# Patient Record
Sex: FEMALE | Race: BLACK OR AFRICAN AMERICAN | Employment: UNEMPLOYED | URBAN - METROPOLITAN AREA
[De-identification: names, ages, dates, MRNs, and addresses within clinical notes are randomized per-mention and may not be internally consistent; named-entity substitution may affect disease eponyms.]

---
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
## 2023-10-03 ENCOUNTER — TELEMEDICINE (OUTPATIENT)
Age: 18
End: 2023-10-03

## 2023-10-03 DIAGNOSIS — L30.9 ECZEMA, UNSPECIFIED TYPE: Primary | ICD-10-CM

## 2023-10-03 RX ORDER — TRIAMCINOLONE ACETONIDE 5 MG/G
CREAM TOPICAL
Qty: 454 G | Refills: 2 | Status: SHIPPED | OUTPATIENT
Start: 2023-10-03 | End: 2023-10-10

## 2023-10-03 SDOH — ECONOMIC STABILITY: INCOME INSECURITY: HOW HARD IS IT FOR YOU TO PAY FOR THE VERY BASICS LIKE FOOD, HOUSING, MEDICAL CARE, AND HEATING?: NOT HARD AT ALL

## 2023-10-03 SDOH — ECONOMIC STABILITY: HOUSING INSECURITY
IN THE LAST 12 MONTHS, WAS THERE A TIME WHEN YOU DID NOT HAVE A STEADY PLACE TO SLEEP OR SLEPT IN A SHELTER (INCLUDING NOW)?: NO

## 2023-10-03 SDOH — ECONOMIC STABILITY: FOOD INSECURITY: WITHIN THE PAST 12 MONTHS, THE FOOD YOU BOUGHT JUST DIDN'T LAST AND YOU DIDN'T HAVE MONEY TO GET MORE.: NEVER TRUE

## 2023-10-03 SDOH — ECONOMIC STABILITY: FOOD INSECURITY: WITHIN THE PAST 12 MONTHS, YOU WORRIED THAT YOUR FOOD WOULD RUN OUT BEFORE YOU GOT MONEY TO BUY MORE.: NEVER TRUE

## 2023-10-03 ASSESSMENT — PATIENT HEALTH QUESTIONNAIRE - PHQ9
SUM OF ALL RESPONSES TO PHQ QUESTIONS 1-9: 0
SUM OF ALL RESPONSES TO PHQ9 QUESTIONS 1 & 2: 0
SUM OF ALL RESPONSES TO PHQ QUESTIONS 1-9: 0
SUM OF ALL RESPONSES TO PHQ QUESTIONS 1-9: 0
1. LITTLE INTEREST OR PLEASURE IN DOING THINGS: 0
2. FEELING DOWN, DEPRESSED OR HOPELESS: 0
SUM OF ALL RESPONSES TO PHQ QUESTIONS 1-9: 0

## 2023-10-03 NOTE — PROGRESS NOTES
Pc with pt. Has ghx of eczema in popliteal area and elbows. Was usint her aunts triamcinolone. Will send in rx for this medicine.

## 2023-10-03 NOTE — PROGRESS NOTES
Chief Complaint   Patient presents with    Eczema     Break out    1. Have you been to the ER, urgent care clinic since your last visit? Hospitalized since your last visit? No    2. Have you seen or consulted any other health care providers outside of the [address] [place] [address] since your last visit? Include any pap smears or colon screening.  No

## 2024-09-26 LAB
ABO, EXTERNAL RESULT: NORMAL
C. TRACHOMATIS, EXTERNAL RESULT: NEGATIVE
HEP B, EXTERNAL RESULT: NEGATIVE
HEPATITIS C ANTIBODY, EXTERNAL RESULT: NEGATIVE
HIV, EXTERNAL RESULT: NEGATIVE
N. GONORRHOEAE, EXTERNAL RESULT: NEGATIVE
RH FACTOR, EXTERNAL RESULT: POSITIVE
RPR, EXTERNAL RESULT: NORMAL
RUBELLA TITER, EXTERNAL RESULT: NORMAL

## 2024-11-09 ENCOUNTER — HOSPITAL ENCOUNTER (EMERGENCY)
Facility: HOSPITAL | Age: 19
Discharge: HOME | End: 2024-11-09
Attending: STUDENT IN AN ORGANIZED HEALTH CARE EDUCATION/TRAINING PROGRAM
Payer: MEDICAID

## 2024-11-09 VITALS
OXYGEN SATURATION: 100 % | RESPIRATION RATE: 16 BRPM | TEMPERATURE: 97.9 F | DIASTOLIC BLOOD PRESSURE: 79 MMHG | SYSTOLIC BLOOD PRESSURE: 148 MMHG | HEART RATE: 86 BPM

## 2024-11-09 DIAGNOSIS — Z34.92 ENCOUNTER FOR PREGNANCY RELATED EXAMINATION IN SECOND TRIMESTER: Primary | ICD-10-CM

## 2024-11-09 PROCEDURE — 99282 EMERGENCY DEPT VISIT SF MDM: CPT

## 2024-11-09 ASSESSMENT — PAIN - FUNCTIONAL ASSESSMENT: PAIN_FUNCTIONAL_ASSESSMENT: NONE - DENIES PAIN

## 2024-11-09 NOTE — ED PROVIDER NOTES
[facility] EMERGENCY DEPT  EMERGENCY DEPARTMENT ENCOUNTER       Pt Name: Buffy Montano  MRN: [id number]  Birthdate 2005  Date of evaluation: 2024  Provider: Michael Benitez MD   PCP: Dieudonne De Dios MD  Note Started: 4:48 PM EST 24     CHIEF COMPLAINT       Chief Complaint   Patient presents with    Pregnancy Problem     Pt is 5 months pregnant and reports no fetal movement x 5 days.  Reports \"some cramping yesterday x 1 hour\".  Pt denies vaginal bleeding.          HISTORY OF PRESENT ILLNESS: 1 or more elements      History From: patient, History limited by: none     Buffy Montano is a 18 y.o. female presenting with decreased fetal movement.  She notes she hasn't felt any fetal movement for 5 days.  She is  at 5 months pregnancy.  She had a small amount of cramping yesterday but it was transient and has felt well today.  Denies abdominal pain, NV, vaginal bleeding, DC or fluids.  No fevers.  Follow at ob clinic near [facility].       Please See MDM for Additional Details of the HPI/PMH  Nursing Notes were all reviewed and agreed with or any disagreements were addressed in the HPI.     REVIEW OF SYSTEMS        Positives and Pertinent negatives as per HPI.    PAST HISTORY     Past Medical History:  Past Medical History:   Diagnosis Date    Asthma     Ill-defined condition     Group B strep at birth 16 days in NICU       Past Surgical History:  History reviewed. No pertinent surgical history.    Family History:  History reviewed. No pertinent family history.    Social History:  Social History     Tobacco Use    Smoking status: Never    Smokeless tobacco: Never   Vaping Use    Vaping status: Never Used   Substance Use Topics    Alcohol use: No    Drug use: No       Allergies:  No Known Allergies    CURRENT MEDICATIONS      Previous Medications    ALBUTEROL SULFATE HFA (PROVENTIL;VENTOLIN;PROAIR) 108 (90 BASE) MCG/ACT INHALER    Inhale 1 puff into the lungs       SCREENINGS               No data recorded  display    Medical Decision Making  18yoF with PMH of asthma presenting with decreased fetal movement.  She notes she hasn't felt any fetal movement for 5 days.  She is  at 5 months pregnancy.  She had a small amount of cramping yesterday but it was transient and has felt well today.  Denies abdominal pain, NV, vaginal bleeding, DC or fluids.  No fevers.  Follow at ob clinic near [facility].  Fetal heart tones noted at 159.  Bedside fetal US shows good fetal movement, normal heart rate.  Appears to be normal amniotic fluid.  Given she is assymptomatic, No indication for labwork or advanced imaging at this time.  Patient was very relieved to hear fetal heart tones and doesn't want any further workup.  Discussed FU with her OB for possibly a formal US and further monitoring.  No clear signs of ectopic pregnancy, uterine rupture, placenta previa or acreta.            FINAL IMPRESSION     1. Encounter for pregnancy related examination in second trimester          DISPOSITION/PLAN   Buffy Montano's  results have been reviewed with her.  She has been counseled regarding her diagnosis, treatment, and plan.  She verbally conveys understanding and agreement of the signs, symptoms, diagnosis, treatment and prognosis and additionally agrees to follow up as discussed.  She also agrees with the care-plan and conveys that all of her questions have been answered.  I have also provided discharge instructions for her that include: educational information regarding their diagnosis and treatment, and list of reasons why they would want to return to the ED prior to their follow-up appointment, should her condition change.     CLINICAL IMPRESSION    Discharge Note: The patient is stable for discharge home. The signs, symptoms, diagnosis, and discharge instructions have been discussed, understanding conveyed, and agreed upon. The patient is to follow up as recommended or return to ER should their symptoms worsen.      PATIENT REFERRED

## 2024-11-09 NOTE — ED NOTES
Patient here with c/o pregnancy problem.  Patient states 5 months pregnant with LY March 14th 2025.  Patient states for the past 5 days she has not felt the baby move, either very little or not at all. Patient reports she had some abdominal cramping yesterday which lasted for about an hour but denies any bleeding.  Patient states that this is her first pregnancy.         Emergency Department Nursing Plan of Care       The Nursing Plan of Care is developed from the Nursing assessment and Emergency Department Attending provider initial evaluation.  The plan of care may be reviewed in the “ED Provider note”.      The Plan of Care was developed with the following considerations:  Patient / Family readiness to learn indicated by:verbalized understanding  Persons(s) to be included in education: patient  Barriers to Learning/Limitations:None      Signed     Elizabeth Pereira RN    11/9/2024   4:45 PM

## 2024-11-09 NOTE — ED NOTES
Patient given copy of dc instructions and 0 script(s). Patient verbalized their understanding of instructions and script(s). Patient given a current medication reconciliation form and verbalized understanding of their medications. Patient verbalized understanding of the importance of discussing medications with his or her physician or clinic they will be following up with. Patient alert and oriented and in no acute distress. Patient discharged from the ER, patient offered a wheelchair, and when offered patient declines wheelchair.

## 2024-12-17 ENCOUNTER — HOSPITAL ENCOUNTER (EMERGENCY)
Facility: HOSPITAL | Age: 19
Discharge: ANOTHER HOSPITAL | End: 2024-12-17
Attending: STUDENT IN AN ORGANIZED HEALTH CARE EDUCATION/TRAINING PROGRAM
Payer: MEDICAID

## 2024-12-17 ENCOUNTER — HOSPITAL ENCOUNTER (INPATIENT)
Facility: HOSPITAL | Age: 19
LOS: 1 days | Discharge: HOME | DRG: 566 | End: 2024-12-17
Attending: EMERGENCY MEDICINE | Admitting: OBSTETRICS & GYNECOLOGY
Payer: MEDICAID

## 2024-12-17 ENCOUNTER — APPOINTMENT (OUTPATIENT)
Facility: HOSPITAL | Age: 19
End: 2024-12-17
Payer: MEDICAID

## 2024-12-17 VITALS
OXYGEN SATURATION: 97 % | TEMPERATURE: 98 F | SYSTOLIC BLOOD PRESSURE: 96 MMHG | DIASTOLIC BLOOD PRESSURE: 53 MMHG | HEART RATE: 112 BPM | BODY MASS INDEX: 25.76 KG/M2 | HEIGHT: 62 IN | WEIGHT: 140 LBS | RESPIRATION RATE: 18 BRPM

## 2024-12-17 VITALS
SYSTOLIC BLOOD PRESSURE: 103 MMHG | HEIGHT: 62 IN | DIASTOLIC BLOOD PRESSURE: 76 MMHG | WEIGHT: 140 LBS | HEART RATE: 117 BPM | OXYGEN SATURATION: 96 % | RESPIRATION RATE: 29 BRPM | TEMPERATURE: 99.1 F | BODY MASS INDEX: 25.76 KG/M2

## 2024-12-17 DIAGNOSIS — O60.02 PRETERM LABOR IN SECOND TRIMESTER WITHOUT DELIVERY: ICD-10-CM

## 2024-12-17 DIAGNOSIS — J11.1 INFLUENZA: ICD-10-CM

## 2024-12-17 DIAGNOSIS — J18.9 PNEUMONIA OF RIGHT UPPER LOBE DUE TO INFECTIOUS ORGANISM: ICD-10-CM

## 2024-12-17 DIAGNOSIS — J11.1 FLU: Primary | ICD-10-CM

## 2024-12-17 DIAGNOSIS — O36.8130 DECREASED FETAL MOVEMENTS IN THIRD TRIMESTER, SINGLE OR UNSPECIFIED FETUS: ICD-10-CM

## 2024-12-17 DIAGNOSIS — G45.9 TIA (TRANSIENT ISCHEMIC ATTACK): Primary | ICD-10-CM

## 2024-12-17 PROBLEM — O47.00 PRETERM CONTRACTIONS: Status: ACTIVE | Noted: 2024-12-17

## 2024-12-17 LAB
ABO + RH BLD: NORMAL
ALBUMIN SERPL-MCNC: 2.3 G/DL (ref 3.5–5)
ALBUMIN/GLOB SERPL: 0.6 (ref 1.1–2.2)
ALP SERPL-CCNC: 52 U/L (ref 45–117)
ALT SERPL-CCNC: 24 U/L (ref 12–78)
ANION GAP SERPL CALC-SCNC: 11 MMOL/L (ref 2–12)
APPEARANCE UR: CLEAR
APTT PPP: 31.1 SEC (ref 22.1–31)
AST SERPL-CCNC: 22 U/L (ref 15–37)
BACTERIA URNS QL MICRO: NEGATIVE /HPF
BASOPHILS # BLD: 0 K/UL (ref 0–0.1)
BASOPHILS NFR BLD: 0 % (ref 0–1)
BILIRUB SERPL-MCNC: 1.4 MG/DL (ref 0.2–1)
BILIRUB UR QL: NEGATIVE
BLOOD GROUP ANTIBODIES SERPL: NORMAL
BUN SERPL-MCNC: 13 MG/DL (ref 6–20)
BUN/CREAT SERPL: 19 (ref 12–20)
CALCIUM SERPL-MCNC: 8.5 MG/DL (ref 8.5–10.1)
CHLORIDE SERPL-SCNC: 102 MMOL/L (ref 97–108)
CO2 SERPL-SCNC: 21 MMOL/L (ref 21–32)
COLOR UR: ABNORMAL
CREAT SERPL-MCNC: 0.67 MG/DL (ref 0.55–1.02)
DIFFERENTIAL METHOD BLD: ABNORMAL
EOSINOPHIL # BLD: 0 K/UL (ref 0–0.4)
EOSINOPHIL NFR BLD: 0 % (ref 0–7)
EPITH CASTS URNS QL MICRO: ABNORMAL /LPF
ERYTHROCYTE [DISTWIDTH] IN BLOOD BY AUTOMATED COUNT: 13.1 % (ref 11.5–14.5)
FETAL BLOOD VOL PATIENT KLEIH BETKE: NORMAL ML
FIBRINOGEN PPP-MCNC: 595 MG/DL (ref 200–475)
FLUAV RNA SPEC QL NAA+PROBE: DETECTED
FLUBV RNA SPEC QL NAA+PROBE: NOT DETECTED
GLOBULIN SER CALC-MCNC: 4.1 G/DL (ref 2–4)
GLUCOSE SERPL-MCNC: 109 MG/DL (ref 65–100)
GLUCOSE UR STRIP.AUTO-MCNC: NEGATIVE MG/DL
HCT VFR BLD AUTO: 30.2 % (ref 35–47)
HGB BLD-MCNC: 10.5 G/DL (ref 11.5–16)
HGB UR QL STRIP: NEGATIVE
IMM GRANULOCYTES # BLD AUTO: 0 K/UL
IMM GRANULOCYTES NFR BLD AUTO: 0 %
INR PPP: 1.2 (ref 0.9–1.1)
KETONES UR QL STRIP.AUTO: 40 MG/DL
LEUKOCYTE ESTERASE UR QL STRIP.AUTO: NEGATIVE
LYMPHOCYTES # BLD: 0.9 K/UL (ref 0.8–3.5)
LYMPHOCYTES NFR BLD: 5 % (ref 12–49)
MCH RBC QN AUTO: 30.7 PG (ref 26–34)
MCHC RBC AUTO-ENTMCNC: 34.8 G/DL (ref 30–36.5)
MCV RBC AUTO: 88.3 FL (ref 80–99)
MONOCYTES # BLD: 0.6 K/UL (ref 0–1)
MONOCYTES NFR BLD: 3 % (ref 5–13)
NEUTS SEG # BLD: 17.3 K/UL (ref 1.8–8)
NEUTS SEG NFR BLD: 92 % (ref 32–75)
NITRITE UR QL STRIP.AUTO: NEGATIVE
NRBC # BLD: 0 K/UL (ref 0–0.01)
NRBC BLD-RTO: 0 PER 100 WBC
PH UR STRIP: 7.5 (ref 5–8)
PLATELET # BLD AUTO: 226 K/UL (ref 150–400)
PMV BLD AUTO: 9.9 FL (ref 8.9–12.9)
POTASSIUM SERPL-SCNC: 3.2 MMOL/L (ref 3.5–5.1)
PROT SERPL-MCNC: 6.4 G/DL (ref 6.4–8.2)
PROT UR STRIP-MCNC: 30 MG/DL
PROTHROMBIN TIME: 12.8 SEC (ref 9–11.1)
RBC # BLD AUTO: 3.42 M/UL (ref 3.8–5.2)
RBC #/AREA URNS HPF: ABNORMAL /HPF (ref 0–5)
RBC MORPH BLD: ABNORMAL
SARS-COV-2 RNA RESP QL NAA+PROBE: NOT DETECTED
SODIUM SERPL-SCNC: 134 MMOL/L (ref 136–145)
SOURCE: ABNORMAL
SP GR UR REFRACTOMETRY: 1.01
SPECIMEN EXP DATE BLD: NORMAL
THERAPEUTIC RANGE: ABNORMAL SECS (ref 58–77)
URINE CULTURE IF INDICATED: ABNORMAL
UROBILINOGEN UR QL STRIP.AUTO: 2 EU/DL (ref 0.2–1)
WBC # BLD AUTO: 18.8 K/UL (ref 3.6–11)
WBC URNS QL MICRO: ABNORMAL /HPF (ref 0–4)

## 2024-12-17 PROCEDURE — 99285 EMERGENCY DEPT VISIT HI MDM: CPT

## 2024-12-17 PROCEDURE — 85025 COMPLETE CBC W/AUTO DIFF WBC: CPT

## 2024-12-17 PROCEDURE — 85730 THROMBOPLASTIN TIME PARTIAL: CPT

## 2024-12-17 PROCEDURE — 85610 PROTHROMBIN TIME: CPT

## 2024-12-17 PROCEDURE — 87636 SARSCOV2 & INF A&B AMP PRB: CPT

## 2024-12-17 PROCEDURE — 59025 FETAL NON-STRESS TEST: CPT

## 2024-12-17 PROCEDURE — 71045 X-RAY EXAM CHEST 1 VIEW: CPT

## 2024-12-17 PROCEDURE — 80053 COMPREHEN METABOLIC PANEL: CPT

## 2024-12-17 PROCEDURE — 87040 BLOOD CULTURE FOR BACTERIA: CPT

## 2024-12-17 PROCEDURE — 96361 HYDRATE IV INFUSION ADD-ON: CPT

## 2024-12-17 PROCEDURE — 36415 COLL VENOUS BLD VENIPUNCTURE: CPT

## 2024-12-17 PROCEDURE — 2580000003 HC RX 258: Performed by: STUDENT IN AN ORGANIZED HEALTH CARE EDUCATION/TRAINING PROGRAM

## 2024-12-17 PROCEDURE — 6370000000 HC RX 637 (ALT 250 FOR IP): Performed by: OBSTETRICS & GYNECOLOGY

## 2024-12-17 PROCEDURE — 93005 ELECTROCARDIOGRAM TRACING: CPT | Performed by: STUDENT IN AN ORGANIZED HEALTH CARE EDUCATION/TRAINING PROGRAM

## 2024-12-17 PROCEDURE — 85384 FIBRINOGEN ACTIVITY: CPT

## 2024-12-17 PROCEDURE — 86901 BLOOD TYPING SEROLOGIC RH(D): CPT

## 2024-12-17 PROCEDURE — 86850 RBC ANTIBODY SCREEN: CPT

## 2024-12-17 PROCEDURE — 6360000002 HC RX W HCPCS: Performed by: STUDENT IN AN ORGANIZED HEALTH CARE EDUCATION/TRAINING PROGRAM

## 2024-12-17 PROCEDURE — 6360000002 HC RX W HCPCS: Performed by: OBSTETRICS & GYNECOLOGY

## 2024-12-17 PROCEDURE — 1100000000 HC RM PRIVATE

## 2024-12-17 PROCEDURE — 96374 THER/PROPH/DIAG INJ IV PUSH: CPT

## 2024-12-17 PROCEDURE — 81001 URINALYSIS AUTO W/SCOPE: CPT

## 2024-12-17 PROCEDURE — 85460 HEMOGLOBIN FETAL: CPT

## 2024-12-17 PROCEDURE — 6360000002 HC RX W HCPCS: Performed by: EMERGENCY MEDICINE

## 2024-12-17 PROCEDURE — 6370000000 HC RX 637 (ALT 250 FOR IP): Performed by: STUDENT IN AN ORGANIZED HEALTH CARE EDUCATION/TRAINING PROGRAM

## 2024-12-17 PROCEDURE — 2580000003 HC RX 258: Performed by: EMERGENCY MEDICINE

## 2024-12-17 PROCEDURE — 96360 HYDRATION IV INFUSION INIT: CPT

## 2024-12-17 PROCEDURE — 86900 BLOOD TYPING SEROLOGIC ABO: CPT

## 2024-12-17 RX ORDER — ENOXAPARIN SODIUM 100 MG/ML
40 INJECTION SUBCUTANEOUS DAILY
Status: CANCELLED | OUTPATIENT
Start: 2024-12-17

## 2024-12-17 RX ORDER — SODIUM CHLORIDE 9 MG/ML
INJECTION, SOLUTION INTRAVENOUS PRN
Status: CANCELLED | OUTPATIENT
Start: 2024-12-17

## 2024-12-17 RX ORDER — ASPIRIN 300 MG/1
300 SUPPOSITORY RECTAL DAILY
Status: CANCELLED | OUTPATIENT
Start: 2024-12-17

## 2024-12-17 RX ORDER — SODIUM CHLORIDE, SODIUM LACTATE, POTASSIUM CHLORIDE, AND CALCIUM CHLORIDE .6; .31; .03; .02 G/100ML; G/100ML; G/100ML; G/100ML
500 INJECTION, SOLUTION INTRAVENOUS PRN
Status: DISCONTINUED | OUTPATIENT
Start: 2024-12-17 | End: 2024-12-17 | Stop reason: HOSPADM

## 2024-12-17 RX ORDER — TERBUTALINE SULFATE 1 MG/ML
0.25 INJECTION, SOLUTION SUBCUTANEOUS ONCE
Status: COMPLETED | OUTPATIENT
Start: 2024-12-17 | End: 2024-12-17

## 2024-12-17 RX ORDER — ONDANSETRON 2 MG/ML
4 INJECTION INTRAMUSCULAR; INTRAVENOUS ONCE
Status: COMPLETED | OUTPATIENT
Start: 2024-12-17 | End: 2024-12-17

## 2024-12-17 RX ORDER — OSELTAMIVIR PHOSPHATE 75 MG/1
75 CAPSULE ORAL 2 TIMES DAILY
Status: DISCONTINUED | OUTPATIENT
Start: 2024-12-17 | End: 2024-12-17 | Stop reason: HOSPADM

## 2024-12-17 RX ORDER — 0.9 % SODIUM CHLORIDE 0.9 %
1000 INTRAVENOUS SOLUTION INTRAVENOUS ONCE
Status: COMPLETED | OUTPATIENT
Start: 2024-12-17 | End: 2024-12-17

## 2024-12-17 RX ORDER — POLYETHYLENE GLYCOL 3350 17 G/17G
17 POWDER, FOR SOLUTION ORAL DAILY PRN
Status: CANCELLED | OUTPATIENT
Start: 2024-12-17

## 2024-12-17 RX ORDER — ACETAMINOPHEN 650 MG/1
650 SUPPOSITORY RECTAL EVERY 4 HOURS PRN
Status: DISCONTINUED | OUTPATIENT
Start: 2024-12-17 | End: 2024-12-17 | Stop reason: HOSPADM

## 2024-12-17 RX ORDER — ACETAMINOPHEN 325 MG/1
650 TABLET ORAL EVERY 4 HOURS PRN
Status: DISCONTINUED | OUTPATIENT
Start: 2024-12-17 | End: 2024-12-17 | Stop reason: HOSPADM

## 2024-12-17 RX ORDER — SODIUM CHLORIDE 0.9 % (FLUSH) 0.9 %
5-40 SYRINGE (ML) INJECTION EVERY 12 HOURS SCHEDULED
Status: CANCELLED | OUTPATIENT
Start: 2024-12-17

## 2024-12-17 RX ORDER — 0.9 % SODIUM CHLORIDE 0.9 %
1000 INTRAVENOUS SOLUTION INTRAVENOUS ONCE
Status: DISCONTINUED | OUTPATIENT
Start: 2024-12-17 | End: 2024-12-17 | Stop reason: HOSPADM

## 2024-12-17 RX ORDER — SODIUM CHLORIDE 0.9 % (FLUSH) 0.9 %
5-40 SYRINGE (ML) INJECTION PRN
Status: CANCELLED | OUTPATIENT
Start: 2024-12-17

## 2024-12-17 RX ORDER — OSELTAMIVIR PHOSPHATE 75 MG/1
75 CAPSULE ORAL 2 TIMES DAILY
Qty: 9 CAPSULE | Refills: 0 | Status: SHIPPED | OUTPATIENT
Start: 2024-12-17 | End: 2024-12-22

## 2024-12-17 RX ORDER — SODIUM CHLORIDE 0.9 % (FLUSH) 0.9 %
5-40 SYRINGE (ML) INJECTION EVERY 12 HOURS SCHEDULED
Status: DISCONTINUED | OUTPATIENT
Start: 2024-12-17 | End: 2024-12-17 | Stop reason: HOSPADM

## 2024-12-17 RX ORDER — ATORVASTATIN CALCIUM 40 MG/1
80 TABLET, FILM COATED ORAL NIGHTLY
Status: CANCELLED | OUTPATIENT
Start: 2024-12-17

## 2024-12-17 RX ORDER — ONDANSETRON 4 MG/1
4 TABLET, ORALLY DISINTEGRATING ORAL EVERY 8 HOURS PRN
Status: CANCELLED | OUTPATIENT
Start: 2024-12-17

## 2024-12-17 RX ORDER — GUAIFENESIN 600 MG/1
600 TABLET, EXTENDED RELEASE ORAL 2 TIMES DAILY
Status: DISCONTINUED | OUTPATIENT
Start: 2024-12-17 | End: 2024-12-17

## 2024-12-17 RX ORDER — ONDANSETRON 4 MG/1
4 TABLET, ORALLY DISINTEGRATING ORAL EVERY 8 HOURS PRN
Status: DISCONTINUED | OUTPATIENT
Start: 2024-12-17 | End: 2024-12-17 | Stop reason: HOSPADM

## 2024-12-17 RX ORDER — GUAIFENESIN/DEXTROMETHORPHAN 100-10MG/5
5 SYRUP ORAL EVERY 4 HOURS PRN
Status: DISCONTINUED | OUTPATIENT
Start: 2024-12-17 | End: 2024-12-17 | Stop reason: HOSPADM

## 2024-12-17 RX ORDER — ONDANSETRON 2 MG/ML
4 INJECTION INTRAMUSCULAR; INTRAVENOUS EVERY 6 HOURS PRN
Status: CANCELLED | OUTPATIENT
Start: 2024-12-17

## 2024-12-17 RX ORDER — ONDANSETRON 2 MG/ML
4 INJECTION INTRAMUSCULAR; INTRAVENOUS EVERY 6 HOURS PRN
Status: DISCONTINUED | OUTPATIENT
Start: 2024-12-17 | End: 2024-12-17 | Stop reason: HOSPADM

## 2024-12-17 RX ORDER — ACETAMINOPHEN 500 MG
1000 TABLET ORAL ONCE
Status: COMPLETED | OUTPATIENT
Start: 2024-12-17 | End: 2024-12-17

## 2024-12-17 RX ORDER — SODIUM CHLORIDE 0.9 % (FLUSH) 0.9 %
5-40 SYRINGE (ML) INJECTION PRN
Status: DISCONTINUED | OUTPATIENT
Start: 2024-12-17 | End: 2024-12-17 | Stop reason: HOSPADM

## 2024-12-17 RX ORDER — AZITHROMYCIN 500 MG/1
500 TABLET, FILM COATED ORAL ONCE
Status: DISCONTINUED | OUTPATIENT
Start: 2024-12-17 | End: 2024-12-17 | Stop reason: HOSPADM

## 2024-12-17 RX ORDER — CEFDINIR 300 MG/1
300 CAPSULE ORAL 2 TIMES DAILY
Qty: 10 CAPSULE | Refills: 0 | Status: SHIPPED | OUTPATIENT
Start: 2024-12-17 | End: 2024-12-22

## 2024-12-17 RX ORDER — AZITHROMYCIN 500 MG/1
500 TABLET, FILM COATED ORAL DAILY
Qty: 4 TABLET | Refills: 0 | Status: SHIPPED | OUTPATIENT
Start: 2024-12-17 | End: 2024-12-21

## 2024-12-17 RX ORDER — SODIUM CHLORIDE, SODIUM LACTATE, POTASSIUM CHLORIDE, CALCIUM CHLORIDE 600; 310; 30; 20 MG/100ML; MG/100ML; MG/100ML; MG/100ML
INJECTION, SOLUTION INTRAVENOUS CONTINUOUS
Status: DISCONTINUED | OUTPATIENT
Start: 2024-12-17 | End: 2024-12-17 | Stop reason: HOSPADM

## 2024-12-17 RX ORDER — ASPIRIN 81 MG/1
81 TABLET, CHEWABLE ORAL DAILY
Status: CANCELLED | OUTPATIENT
Start: 2024-12-17

## 2024-12-17 RX ORDER — SODIUM CHLORIDE 9 MG/ML
25 INJECTION, SOLUTION INTRAVENOUS PRN
Status: DISCONTINUED | OUTPATIENT
Start: 2024-12-17 | End: 2024-12-17 | Stop reason: HOSPADM

## 2024-12-17 RX ADMIN — ACETAMINOPHEN 1000 MG: 500 TABLET ORAL at 07:45

## 2024-12-17 RX ADMIN — SODIUM CHLORIDE 1000 ML: 9 INJECTION, SOLUTION INTRAVENOUS at 07:34

## 2024-12-17 RX ADMIN — AZITHROMYCIN MONOHYDRATE 500 MG: 500 INJECTION, POWDER, LYOPHILIZED, FOR SOLUTION INTRAVENOUS at 12:27

## 2024-12-17 RX ADMIN — TERBUTALINE SULFATE 0.25 MG: 1 INJECTION, SOLUTION SUBCUTANEOUS at 14:40

## 2024-12-17 RX ADMIN — ONDANSETRON 4 MG: 2 INJECTION INTRAMUSCULAR; INTRAVENOUS at 07:43

## 2024-12-17 RX ADMIN — SODIUM CHLORIDE 1000 ML: 9 INJECTION, SOLUTION INTRAVENOUS at 13:02

## 2024-12-17 RX ADMIN — WATER 1000 MG: 1 INJECTION INTRAMUSCULAR; INTRAVENOUS; SUBCUTANEOUS at 12:23

## 2024-12-17 RX ADMIN — OSELTAMIVIR PHOSPHATE 75 MG: 75 CAPSULE ORAL at 12:22

## 2024-12-17 RX ADMIN — GUAIFENESIN 600 MG: 600 TABLET, EXTENDED RELEASE ORAL at 14:39

## 2024-12-17 ASSESSMENT — ENCOUNTER SYMPTOMS
EYES NEGATIVE: 1
SHORTNESS OF BREATH: 1
ABDOMINAL PAIN: 1

## 2024-12-17 ASSESSMENT — PAIN DESCRIPTION - ORIENTATION: ORIENTATION: LOWER

## 2024-12-17 ASSESSMENT — PAIN SCALES - GENERAL
PAINLEVEL_OUTOF10: 8

## 2024-12-17 ASSESSMENT — PAIN DESCRIPTION - DESCRIPTORS
DESCRIPTORS: CRAMPING
DESCRIPTORS: DISCOMFORT

## 2024-12-17 ASSESSMENT — PAIN - FUNCTIONAL ASSESSMENT
PAIN_FUNCTIONAL_ASSESSMENT: 0-10
PAIN_FUNCTIONAL_ASSESSMENT: PREVENTS OR INTERFERES WITH MANY ACTIVE NOT PASSIVE ACTIVITIES

## 2024-12-17 ASSESSMENT — PAIN DESCRIPTION - LOCATION
LOCATION: ABDOMEN
LOCATION: ABDOMEN

## 2024-12-17 ASSESSMENT — PAIN DESCRIPTION - FREQUENCY: FREQUENCY: INTERMITTENT

## 2024-12-17 ASSESSMENT — PAIN DESCRIPTION - PAIN TYPE: TYPE: ACUTE PAIN

## 2024-12-17 ASSESSMENT — LIFESTYLE VARIABLES
HOW MANY STANDARD DRINKS CONTAINING ALCOHOL DO YOU HAVE ON A TYPICAL DAY: PATIENT DOES NOT DRINK
HOW OFTEN DO YOU HAVE A DRINK CONTAINING ALCOHOL: NEVER

## 2024-12-17 ASSESSMENT — PAIN DESCRIPTION - ONSET: ONSET: SUDDEN

## 2024-12-17 NOTE — ED NOTES
Spoke with Rabia in OB.  They need ER doctor to see patient before OB will accept patient upstairs.  Advised Dr. Wesley, ED doctor.  He will now put his note in that he has already seen the patient.

## 2024-12-17 NOTE — CONSULTS
[facility] Adult  Hospitalist Group    Hospitalist Consult  Primary Care Provider: Dieudonne De Dios MD  Consult requested by: Prabhjot Parks MD    Subjective:     Buffy Montano is a 18 y.o. female who presents with  with gestational age of 27-week 4-day was admitted to OB/GYN service for uterine contraction.  Medical consult was called because patient has subjective fever nonproductive cough and congestion a chest x-ray showing pneumonia and patient is flu positive  We are asked to see the patient in consult to assist in managing fluid and.    Review of patient's chart did not reveal any intraoperative or postoperative complications. At this time the patient has no complaints.     He/She denies any headaches or dizziness. Denies any cough, chest pain, shortness of breath. Denies fever or chills. Denies nausea, vomiting, diarrhea, constipation.     Review of Systems:    Pertinent items are noted in the History of Present Illness.     Past Medical History:   Diagnosis Date    Asthma     PRN- using while sick    Ill-defined condition     Group B strep at birth 16 days in NICU      History reviewed. No pertinent surgical history.  Prior to Admission medications    Medication Sig Start Date End Date Taking? Authorizing Provider   albuterol sulfate HFA (PROVENTIL;VENTOLIN;PROAIR) 108 (90 Base) MCG/ACT inhaler Inhale 1 puff into the lungs every 6 hours as needed for Wheezing or Shortness of Breath   Yes Automatic Reconciliation, Ar     No Known Allergies   History reviewed. No pertinent family history.     SOCIAL HISTORY:  Patient resides at Home.  Patient ambulates with self.   Smoking history: unknown  Alcohol history: none    Objective:       Physical Exam:   /84   Pulse 94   Temp 98.7 °F (37.1 °C) (Oral)   Resp 18   Ht 1.575 m (5' 2\")   Wt 63.5 kg (140 lb)   SpO2 97%   BMI 25.61 kg/m²     General : alert x 3, awake, no acute distress,   HEENT: moist mucus membrane  Neck: supple, no JVD, no

## 2024-12-17 NOTE — H&P
History & Physical    Name: Buffy Montano MRN: [id number]  SSN: [id number]    YOB: 2005  Age: 18 y.o.  Sex: female      Subjective:     Reason for Admission:  Pregnancy and Contractions    History of Present Illness: Buffy Montano is a 18 y.o.  female with an estimated gestational age of 27w4d with Estimated Date of Delivery: 3/14/25. Patient complains of moderate abdominal pain for 2 days. Pregnancy has been complicated by  none .  Patient denies vaginal bleeding  and vaginal leaking of fluid .    Buffy Montano is a 18 y.o. female  at 6months pregnancy with no significant past medical history who presents to the ED with chief complaint of abdominal pain.  She reports lower abdominal pressure that began yesterday evening.  Comes in waves.  No vaginal bleeding, discharge, or dysuria.  She reports that over the past week she has had flu-like symptoms with subjective fever, nonproductive cough, and congestion.  She has not been feeling her baby move much over the past week.  She follows with Dr. Greenfield at [facility].  No complications during this pregnancy.     She was seen at [facility] ED , then sent here for further evaluation of her contractions. She was diagnosed with Influenza A and Phemonia @ [facility]    OB History          1    Para        Term                AB        Living             SAB        IAB        Ectopic        Molar        Multiple        Live Births                  Past Medical History:   Diagnosis Date    Asthma     Ill-defined condition     Group B strep at birth 16 days in NICU     History reviewed. No pertinent surgical history.  Social History     Occupational History    Not on file   Tobacco Use    Smoking status: Never    Smokeless tobacco: Never   Vaping Use    Vaping status: Never Used   Substance and Sexual Activity    Alcohol use: No    Drug use: No    Sexual activity: Not on file     History reviewed. No pertinent family

## 2024-12-17 NOTE — PROGRESS NOTES
S: Patient comfortably sleeping in the bed. Denies contractions or SOB. Still having cough.     O:   Patient Vitals for the past 24 hrs:   BP Temp Temp src Pulse Resp SpO2 Height Weight   12/17/24 1338 (!) 92/59 98 °F (36.7 °C) -- (!) 116 20 96 % -- --   12/17/24 1319 134/84 -- -- 94 18 97 % -- --   12/17/24 0945 100/62 98.7 °F (37.1 °C) Oral (!) 113 20 98 % 1.575 m (5' 2\") 63.5 kg (140 lb)       Gen: alert and oriented X3   Resp:nonlabored respirations  Cardiac: normal peripheral perfusion  Abdomen: soft, nontender, nondistended. Gravid  Ext: no pitting edema  SVE by Dr. Parks 1-2cm/50/-3 (1430) unchanged from prior    CEFM: 150bp, mod variability, intermittent non recurrent variables, +Accels  [place]: none        A/P 17yo G1 at 27w4d with influenza A infection and right lower lobe pneumonia, admitted for observation for PTL given uterine contractions.     Influenza A/RLL Pneumonia: on ceftriaxone/azithromycin, tamiflu  - Sinus tachycardia on EKG  - S/p IM consult, cleared for discharge   - Contacted IM: recommends OP regimen zithromax 500mg qd, omnicef 300 BID 5 days    Contractions: likely 2/2 infection  - UA neg, WBC 18.8  - cervix 1-2/50/-3 upon arrival (1000), unchanged 1430  - s/p terbutaline for painful contractions 1430, no contractions/pain since then    Fetal tracing: overall cat 1, periods of intermittent nonrecurrent variables  - continue to  monitor      Dispo: cont obs.  If contractions continue to resolve and no cervical change, and cat 1 fetal tracing, can consider discharge home. D/w OB Hospitalist Dr. Charly Greenfield MD

## 2024-12-17 NOTE — ED TRIAGE NOTES
TRIAGE: Pt arrives via EMS as a transfer from [facility] for lower abdominal pain and flu +. Pt is 27 weeks pregnant. Denies N/V/D, sob, cp. +fever but given tylenol before arrival.

## 2024-12-17 NOTE — ED NOTES
TRANSFER - OUT REPORT:    Verbal report given to PATTI GURROLA on Buffy Montano  being transferred to [facility] ED for urgent transfer       Report consisted of patient's Situation, Background, Assessment and   Recommendations(SBAR).     Information from the following report(s) Nurse Handoff Report, Index, ED Encounter Summary, ED SBAR, MAR, and Recent Results was reviewed with the receiving nurse.    [place] Fall Assessment:    Presents to emergency department  because of falls (Syncope, seizure, or loss of consciousness): No  Age > 70: No  Altered Mental Status, Intoxication with alcohol or substance confusion (Disorientation, impaired judgment, poor safety awaremess, or inability to follow instructions): No  Impaired Mobility: Ambulates or transfers with assistive devices or assistance; Unable to ambulate or transer.: No  Nursing Judgement: No          Lines:   Peripheral IV 12/17/24 Left Antecubital (Active)        Opportunity for questions and clarification was provided.      Patient transported with RAA and EMTALA

## 2024-12-17 NOTE — ED NOTES
Pt presents ambulatory to ED complaining of congestion and productive cough x1 week. Pt also reporting intermittent abd cramping. Pt is 6mo gestation. Pt reports nausea no vomiting. Pt reports episodes of lightheadedness x1 week. Pt is tachycardic. Pt denies urinary sx. Pt is G1. Pt is alert and oriented x 4, RR even and unlabored, skin is warm and dry. Assesment completed and pt updated on plan of care.       Emergency Department Nursing Plan of Care       The Nursing Plan of Care is developed from the Nursing assessment and Emergency Department Attending provider initial evaluation.  The plan of care may be reviewed in the “ED Provider note”.    The Plan of Care was developed with the following considerations:   Patient / Family readiness to learn indicated by:verbalized understanding  Persons(s) to be included in education: patient  Barriers to Learning/Limitations:None    Signed     America Dhillon RN    12/17/2024   8:05 AM

## 2024-12-17 NOTE — ED PROVIDER NOTES
states that her cervix appears mildly dilated so they will accept her to their service.  He states that they will consult the medicine service.  IV antibiotics have been administered for her pneumonia.     Risk  Prescription drug management.  Decision regarding hospitalization.            REASSESSMENT            CONSULTS:  IP CONSULT TO OB HOSPITALIST  IP CONSULT TO INTERNAL MEDICINE    PROCEDURES:  Unless otherwise noted below, none     Procedures      FINAL IMPRESSION       labor in second trimester without delivery    Pneumonia of right upper lobe due to infectious organism    Influenza        DISPOSITION/PLAN   DISPOSITION Admitted 2024 11:46:09 AM      PATIENT REFERRED TO:  No follow-up provider specified.    DISCHARGE MEDICATIONS:  Current Discharge Medication List            (Please note that portions of this note were completed with a voice recognition program.  Efforts were made to edit the dictations but occasionally words are mis-transcribed.)    Lance Wesley MD (electronically signed)  Emergency Attending Physician / Physician Assistant / Nurse Practitioner            Lance Wesley MD  [phone]       Lance Wesley MD  [phone]

## 2024-12-17 NOTE — PROGRESS NOTES
1345 Pt tx from ED to LDR 7 for monitoring. IVF running. Abx finished. VSS. EFM initiated. Pt states contractions have gotten more intense since she's been here.    1405 Dr Parks at bedside. SVE unchanged. Labs reviewed. Guaifenesin and terbutaline ordered.    1500 Pt sleeping comfortably, denies feeling any contractions.    1530 Dr Mims at bedside. Will write for continuation of abx for 5 days. Tylenol, guiaf, zofran PRN. Recheck CBC in AM if pt still admitted. Pt has been sleeping comfortably with no complaints.    1650 Dr Greenfield at bedside. Discussing POC with pt and family. Will make sure contractions do not resume after terbutaline admin before deciding on DC. Pt had one contractions with MD at bedside, otherwise has been comfortable.    1800 Pt requesting more cough medicine, call placed to pharmacy for what is safe. Robitussin DM ordered.      1900 Orders given to DC EFM.

## 2024-12-17 NOTE — ED PROVIDER NOTES
to support critical care: initiation of sepsis evaluation, plan for IV abx and IVF, transfer to higher level of care in the setting of pregnancy  Failure to intervene may result in: end-organ damage, hypotension/shock, respiratory failure, early delivery, death      EMERGENCY DEPARTMENT COURSE and DIFFERENTIAL DIAGNOSIS/MDM   Vitals:    Vitals:    24 0843 24 0848 24 0858 24 0908   BP: 103/64 (!) 103/59 99/71 103/76   Pulse: (!) 125 (!) 111 (!) 118 (!) 117   Resp: (!) 31 24 25 29   Temp:       TempSrc:       SpO2: 94% 95% 96%    Weight:       Height:         Patient was given the following medications:  Medications   cefTRIAXone (ROCEPHIN) 2,000 mg in sodium chloride 0.9 % 50 mL IVPB (mini-bag) (has no administration in time range)   azithromycin (ZITHROMAX) tablet 500 mg (has no administration in time range)   sodium chloride 0.9 % bolus 1,000 mL (has no administration in time range)   sodium chloride 0.9 % bolus 1,000 mL (0 mLs IntraVENous Stopped 24 0844)   ondansetron (ZOFRAN) injection 4 mg (4 mg IntraVENous Given 24 0743)   acetaminophen (TYLENOL) tablet 1,000 mg (1,000 mg Oral Given 24 0745)       Medical Decision Making  Patient is a 19yo female who is a  at 6months pregnancy who presents to the ED with chief complaint of lower abdominal cramping in the setting of flulike symptoms over the past week.  She also reports that she has had decreased fetal movement here.  Concerned regarding her complaints that she needs obstetric evaluation - she sees providers at [facility].  Possible that symptoms are related to muscle aches from flu.  Doubt pneumonia with clear lungs - give pregnancy and low suspicion for pneumonia will defer CXR.  - CBC, CMP  - UA  - Type and screen  - IVF, IV zofran, PO tylenol  - Fetal heart tones  - EKG  - COVID and flu testing  - Discussion with OB hospitalist at [facility]; anticipate ED to ED transfer for obstetric evaluation, higher level

## 2024-12-17 NOTE — PROGRESS NOTES
CTSP due to continued Contraction pain  Cervix unchanged 1-/-3  No contractions by EFM  , mod variability and no Decelerations  A/p - Pneumonia and Influenza  27w4d  contractions w/o cervix change  Will give terbutaline for contractions, if persist consider procardia, BMTZ, Mgso4  Dr Greenfield aware of patient admission  Medicine consult placed and pending for Pneumonia Management

## 2024-12-17 NOTE — ED TRIAGE NOTES
Pt arrives ambulatory with cc of cold like symptoms, cough, congestion, fever.  Pt is also approx 6 months pregnant.  Now endorses abdominal cramping.

## 2024-12-18 LAB
EKG ATRIAL RATE: 115 BPM
EKG DIAGNOSIS: NORMAL
EKG P AXIS: 63 DEGREES
EKG P-R INTERVAL: 126 MS
EKG Q-T INTERVAL: 328 MS
EKG QRS DURATION: 74 MS
EKG QTC CALCULATION (BAZETT): 453 MS
EKG R AXIS: 79 DEGREES
EKG T AXIS: 10 DEGREES
EKG VENTRICULAR RATE: 115 BPM

## 2024-12-18 PROCEDURE — 93010 ELECTROCARDIOGRAM REPORT: CPT | Performed by: SPECIALIST

## 2024-12-18 NOTE — PROGRESS NOTES
@ 1930 SBAR report received from PATSY Chino RN    @ 2030 patient is ok to go home per Dr. Parks; patient agrees with POC     @ 2113 education given; patient confirms understanding     @ 2125 patient discharged home in care of mother

## 2024-12-18 NOTE — PROGRESS NOTES
To Whom May concern:    Buffy Montano was admitted to the hospital today and is being treated for Pneumonia and Influenza in pregnancy and having  contractions. She will need to be off work for the next 5-7 days.    Prabhjot Parks MD   Ob/GYN Hospitalist

## 2024-12-18 NOTE — PROGRESS NOTES
12/17/2024          To Whom It May Concern,    Colin Blank was in the hospital with Buffy Montano today 12/17. He will need to be excused from work for 12/17 and 12/18.        Sincerely,        Charmaine Guzmán RN  [facility]

## 2024-12-19 NOTE — DISCHARGE SUMMARY
Obstetrical Discharge Summary     Name: Buffy Montano MRN: [id number]  SSN: [id number]    YOB: 2005  Age: 18 y.o.  Sex: female      Admit Date: 2024    Discharge Date: 2024     Admitting Physician: Prabhjot Parks MD     Attending Physician:  No att. providers found     Admission Diagnoses: TIA (transient ischemic attack) [G45.9]   contractions [O47.00]    Discharge Diagnoses:   This patient has no babies on file.     Additional Diagnoses:  No components found for: \"OBEXTABORH\", \"OBEXTABSCRN\", \"OBEXTRUBELLA\", \"OBEXTGRBS\"    Hospital Course: 17yo G1 at 27w4d with Left lower lobe PNA and influenza A, transferred from [facility] ED for contractions. She was observed for PTL on L&D for 12 hours with no change in her cervix. She was given terbutaline which made her contractions subside. Her cervix was stable at 1-2cm/50% and was discharged home with outpatient abx regimen and tamiflu.      Patient Instructions:   Discharge Medication List as of 2024  8:41 PM        START taking these medications    Details   azithromycin (ZITHROMAX) 500 MG tablet Take 1 tablet by mouth daily for 4 days, Disp-4 tablet, R-0Normal      cefdinir (OMNICEF) 300 MG capsule Take 1 capsule by mouth 2 times daily for 5 days, Disp-10 capsule, R-0Normal      oseltamivir (TAMIFLU) 75 MG capsule Take 1 capsule by mouth 2 times daily for 9 doses, Disp-9 capsule, R-0Normal           CONTINUE these medications which have NOT CHANGED    Details   albuterol sulfate HFA (PROVENTIL;VENTOLIN;PROAIR) 108 (90 Base) MCG/ACT inhaler Inhale 1 puff into the lungs every 6 hours as needed for Wheezing or Shortness of BreathHistorical Med             Disposition at Discharge: Home or self care    Condition at Discharge: Stable    Reference my discharge instructions.    No follow-ups on file.     Signed By:  Nkechi Greenfield MD     2024

## 2024-12-22 LAB
BACTERIA SPEC CULT: NORMAL
BACTERIA SPEC CULT: NORMAL
SERVICE CMNT-IMP: NORMAL
SERVICE CMNT-IMP: NORMAL

## 2024-12-23 ENCOUNTER — ANESTHESIA (OUTPATIENT)
Facility: HOSPITAL | Age: 19
DRG: 560 | End: 2024-12-23
Payer: MEDICAID

## 2024-12-23 ENCOUNTER — HOSPITAL ENCOUNTER (INPATIENT)
Facility: HOSPITAL | Age: 19
LOS: 9 days | Discharge: HOME | DRG: 560 | End: 2025-01-01
Attending: STUDENT IN AN ORGANIZED HEALTH CARE EDUCATION/TRAINING PROGRAM | Admitting: STUDENT IN AN ORGANIZED HEALTH CARE EDUCATION/TRAINING PROGRAM
Payer: MEDICAID

## 2024-12-23 ENCOUNTER — ANESTHESIA EVENT (OUTPATIENT)
Facility: HOSPITAL | Age: 19
DRG: 560 | End: 2024-12-23
Payer: MEDICAID

## 2024-12-23 LAB
ABO + RH BLD: NORMAL
BASOPHILS # BLD: 0 K/UL (ref 0–0.1)
BASOPHILS NFR BLD: 0 % (ref 0–1)
BLOOD GROUP ANTIBODIES SERPL: NORMAL
DIFFERENTIAL METHOD BLD: ABNORMAL
EOSINOPHIL # BLD: 0.3 K/UL (ref 0–0.4)
EOSINOPHIL NFR BLD: 2 % (ref 0–7)
ERYTHROCYTE [DISTWIDTH] IN BLOOD BY AUTOMATED COUNT: 13 % (ref 11.5–14.5)
FIBRONECTIN FETAL VAG QL: POSITIVE
GBS, EXTERNAL RESULT: NEGATIVE
HCT VFR BLD AUTO: 29.3 % (ref 35–47)
HGB BLD-MCNC: 9.8 G/DL (ref 11.5–16)
IMM GRANULOCYTES # BLD AUTO: 0 K/UL (ref 0–0.04)
IMM GRANULOCYTES NFR BLD AUTO: 0 % (ref 0–0.5)
LYMPHOCYTES # BLD: 1.3 K/UL (ref 0.8–3.5)
LYMPHOCYTES NFR BLD: 10 % (ref 12–49)
MCH RBC QN AUTO: 30.3 PG (ref 26–34)
MCHC RBC AUTO-ENTMCNC: 33.4 G/DL (ref 30–36.5)
MCV RBC AUTO: 90.7 FL (ref 80–99)
MONOCYTES # BLD: 0.6 K/UL (ref 0–1)
MONOCYTES NFR BLD: 5 % (ref 5–13)
NEUTS SEG # BLD: 10.5 K/UL (ref 1.8–8)
NEUTS SEG NFR BLD: 83 % (ref 32–75)
NRBC # BLD: 0 K/UL (ref 0–0.01)
NRBC BLD-RTO: 0 PER 100 WBC
PLATELET # BLD AUTO: 388 K/UL (ref 150–400)
PMV BLD AUTO: 9.1 FL (ref 8.9–12.9)
RBC # BLD AUTO: 3.23 M/UL (ref 3.8–5.2)
RBC MORPH BLD: ABNORMAL
SPECIMEN EXP DATE BLD: NORMAL
WBC # BLD AUTO: 12.7 K/UL (ref 3.6–11)

## 2024-12-23 PROCEDURE — 86900 BLOOD TYPING SEROLOGIC ABO: CPT

## 2024-12-23 PROCEDURE — 86850 RBC ANTIBODY SCREEN: CPT

## 2024-12-23 PROCEDURE — 2500000003 HC RX 250 WO HCPCS: Performed by: NURSE ANESTHETIST, CERTIFIED REGISTERED

## 2024-12-23 PROCEDURE — 6360000002 HC RX W HCPCS: Performed by: NURSE ANESTHETIST, CERTIFIED REGISTERED

## 2024-12-23 PROCEDURE — 36415 COLL VENOUS BLD VENIPUNCTURE: CPT

## 2024-12-23 PROCEDURE — 85025 COMPLETE CBC W/AUTO DIFF WBC: CPT

## 2024-12-23 PROCEDURE — 2500000003 HC RX 250 WO HCPCS: Performed by: STUDENT IN AN ORGANIZED HEALTH CARE EDUCATION/TRAINING PROGRAM

## 2024-12-23 PROCEDURE — 86780 TREPONEMA PALLIDUM: CPT

## 2024-12-23 PROCEDURE — 94761 N-INVAS EAR/PLS OXIMETRY MLT: CPT

## 2024-12-23 PROCEDURE — 87150 DNA/RNA AMPLIFIED PROBE: CPT

## 2024-12-23 PROCEDURE — 87081 CULTURE SCREEN ONLY: CPT

## 2024-12-23 PROCEDURE — 2580000003 HC RX 258: Performed by: STUDENT IN AN ORGANIZED HEALTH CARE EDUCATION/TRAINING PROGRAM

## 2024-12-23 PROCEDURE — 1100000000 HC RM PRIVATE

## 2024-12-23 PROCEDURE — 6360000002 HC RX W HCPCS: Performed by: STUDENT IN AN ORGANIZED HEALTH CARE EDUCATION/TRAINING PROGRAM

## 2024-12-23 PROCEDURE — G0379 DIRECT REFER HOSPITAL OBSERV: HCPCS

## 2024-12-23 PROCEDURE — 7210000100 HC LABOR FEE PER 1 HR: Performed by: OBSTETRICS & GYNECOLOGY

## 2024-12-23 PROCEDURE — G0378 HOSPITAL OBSERVATION PER HR: HCPCS

## 2024-12-23 PROCEDURE — 00HU33Z INSERTION OF INFUSION DEVICE INTO SPINAL CANAL, PERCUTANEOUS APPROACH: ICD-10-PCS | Performed by: ANESTHESIOLOGY

## 2024-12-23 PROCEDURE — 82731 ASSAY OF FETAL FIBRONECTIN: CPT

## 2024-12-23 PROCEDURE — 6360000002 HC RX W HCPCS

## 2024-12-23 PROCEDURE — 51702 INSERT TEMP BLADDER CATH: CPT

## 2024-12-23 PROCEDURE — 3700000025 EPIDURAL BLOCK: Performed by: ANESTHESIOLOGY

## 2024-12-23 PROCEDURE — 86901 BLOOD TYPING SEROLOGIC RH(D): CPT

## 2024-12-23 RX ORDER — MAGNESIUM SULFATE IN WATER 40 MG/ML
INJECTION, SOLUTION INTRAVENOUS
Status: COMPLETED
Start: 2024-12-23 | End: 2024-12-23

## 2024-12-23 RX ORDER — FENTANYL/BUPIVACAINE/NS/PF 2-1250MCG
8 PLASTIC BAG, INJECTION (ML) INJECTION CONTINUOUS
Status: DISCONTINUED | OUTPATIENT
Start: 2024-12-23 | End: 2024-12-29

## 2024-12-23 RX ORDER — ONDANSETRON 4 MG/1
4 TABLET, ORALLY DISINTEGRATING ORAL EVERY 6 HOURS PRN
Status: DISCONTINUED | OUTPATIENT
Start: 2024-12-23 | End: 2025-01-01 | Stop reason: HOSPADM

## 2024-12-23 RX ORDER — TRANEXAMIC ACID 10 MG/ML
1000 INJECTION, SOLUTION INTRAVENOUS
Status: ACTIVE | OUTPATIENT
Start: 2024-12-23 | End: 2024-12-24

## 2024-12-23 RX ORDER — ONDANSETRON 2 MG/ML
4 INJECTION INTRAMUSCULAR; INTRAVENOUS EVERY 6 HOURS PRN
Status: DISCONTINUED | OUTPATIENT
Start: 2024-12-23 | End: 2025-01-01 | Stop reason: HOSPADM

## 2024-12-23 RX ORDER — NALOXONE HYDROCHLORIDE 0.4 MG/ML
INJECTION, SOLUTION INTRAMUSCULAR; INTRAVENOUS; SUBCUTANEOUS PRN
Status: DISCONTINUED | OUTPATIENT
Start: 2024-12-23 | End: 2024-12-29

## 2024-12-23 RX ORDER — ONDANSETRON 2 MG/ML
4 INJECTION INTRAMUSCULAR; INTRAVENOUS EVERY 6 HOURS PRN
Status: DISCONTINUED | OUTPATIENT
Start: 2024-12-23 | End: 2024-12-29

## 2024-12-23 RX ORDER — METHYLERGONOVINE MALEATE 0.2 MG/ML
200 INJECTION INTRAVENOUS PRN
Status: DISCONTINUED | OUTPATIENT
Start: 2024-12-23 | End: 2024-12-27

## 2024-12-23 RX ORDER — MAGNESIUM SULFATE HEPTAHYDRATE 40 MG/ML
2000 INJECTION, SOLUTION INTRAVENOUS ONCE
Status: DISCONTINUED | OUTPATIENT
Start: 2024-12-23 | End: 2024-12-23

## 2024-12-23 RX ORDER — BUPIVACAINE HYDROCHLORIDE 2.5 MG/ML
INJECTION, SOLUTION EPIDURAL; INFILTRATION; INTRACAUDAL
Status: DISCONTINUED | OUTPATIENT
Start: 2024-12-23 | End: 2024-12-30 | Stop reason: SDUPTHER

## 2024-12-23 RX ORDER — MAGNESIUM SULFATE HEPTAHYDRATE 40 MG/ML
2000 INJECTION, SOLUTION INTRAVENOUS ONCE
Status: COMPLETED | OUTPATIENT
Start: 2024-12-23 | End: 2024-12-23

## 2024-12-23 RX ORDER — SODIUM CHLORIDE, SODIUM LACTATE, POTASSIUM CHLORIDE, CALCIUM CHLORIDE 600; 310; 30; 20 MG/100ML; MG/100ML; MG/100ML; MG/100ML
INJECTION, SOLUTION INTRAVENOUS CONTINUOUS
Status: DISCONTINUED | OUTPATIENT
Start: 2024-12-23 | End: 2024-12-27

## 2024-12-23 RX ORDER — CALCIUM GLUCONATE 94 MG/ML
1000 INJECTION, SOLUTION INTRAVENOUS PRN
Status: DISCONTINUED | OUTPATIENT
Start: 2024-12-23 | End: 2024-12-30

## 2024-12-23 RX ORDER — LIDOCAINE HYDROCHLORIDE AND EPINEPHRINE 15; 5 MG/ML; UG/ML
INJECTION, SOLUTION EPIDURAL
Status: DISCONTINUED | OUTPATIENT
Start: 2024-12-23 | End: 2024-12-30 | Stop reason: SDUPTHER

## 2024-12-23 RX ORDER — MAGNESIUM SULFATE HEPTAHYDRATE 40 MG/ML
4000 INJECTION, SOLUTION INTRAVENOUS ONCE
Status: DISCONTINUED | OUTPATIENT
Start: 2024-12-23 | End: 2024-12-23

## 2024-12-23 RX ORDER — MAGNESIUM SULFATE HEPTAHYDRATE 40 MG/ML
4000 INJECTION, SOLUTION INTRAVENOUS ONCE
Status: COMPLETED | OUTPATIENT
Start: 2024-12-23 | End: 2024-12-23

## 2024-12-23 RX ORDER — BETAMETHASONE SODIUM PHOSPHATE AND BETAMETHASONE ACETATE 3; 3 MG/ML; MG/ML
12 INJECTION, SUSPENSION INTRA-ARTICULAR; INTRALESIONAL; INTRAMUSCULAR; SOFT TISSUE DAILY
Status: COMPLETED | OUTPATIENT
Start: 2024-12-23 | End: 2024-12-24

## 2024-12-23 RX ORDER — EPHEDRINE SULFATE/0.9% NACL/PF 25 MG/5 ML
10 SYRINGE (ML) INTRAVENOUS ONCE
Status: DISCONTINUED | OUTPATIENT
Start: 2024-12-23 | End: 2024-12-29

## 2024-12-23 RX ORDER — MISOPROSTOL 200 UG/1
400 TABLET ORAL PRN
Status: DISCONTINUED | OUTPATIENT
Start: 2024-12-23 | End: 2024-12-27

## 2024-12-23 RX ORDER — CARBOPROST TROMETHAMINE 250 UG/ML
250 INJECTION, SOLUTION INTRAMUSCULAR PRN
Status: DISCONTINUED | OUTPATIENT
Start: 2024-12-23 | End: 2025-01-01 | Stop reason: HOSPADM

## 2024-12-23 RX ADMIN — PENICILLIN G POTASSIUM 5 MILLION UNITS: 5000000 INJECTION, POWDER, FOR SOLUTION INTRAMUSCULAR; INTRAVENOUS at 12:28

## 2024-12-23 RX ADMIN — MAGNESIUM SULFATE HEPTAHYDRATE 4000 MG: 40 INJECTION, SOLUTION INTRAVENOUS at 12:40

## 2024-12-23 RX ADMIN — Medication 10 ML/HR: at 15:43

## 2024-12-23 RX ADMIN — BUPIVACAINE HYDROCHLORIDE 5 MG: 2.5 INJECTION, SOLUTION EPIDURAL; INFILTRATION; INTRACAUDAL; PERINEURAL at 15:36

## 2024-12-23 RX ADMIN — BETAMETHASONE ACETATE AND BETAMETHASONE SODIUM PHOSPHATE 12 MG: 3; 3 INJECTION, SUSPENSION INTRA-ARTICULAR; INTRALESIONAL; INTRAMUSCULAR; SOFT TISSUE at 13:45

## 2024-12-23 RX ADMIN — MAGNESIUM SULFATE IN WATER 2000 MG/HR: 20 INJECTION, SOLUTION INTRAVENOUS at 13:23

## 2024-12-23 RX ADMIN — SODIUM CHLORIDE 2.5 MILLION UNITS: 9 INJECTION, SOLUTION INTRAVENOUS at 16:27

## 2024-12-23 RX ADMIN — SODIUM CHLORIDE, POTASSIUM CHLORIDE, SODIUM LACTATE AND CALCIUM CHLORIDE: 600; 310; 30; 20 INJECTION, SOLUTION INTRAVENOUS at 17:56

## 2024-12-23 RX ADMIN — SODIUM CHLORIDE, POTASSIUM CHLORIDE, SODIUM LACTATE AND CALCIUM CHLORIDE: 600; 310; 30; 20 INJECTION, SOLUTION INTRAVENOUS at 12:26

## 2024-12-23 RX ADMIN — MAGNESIUM SULFATE HEPTAHYDRATE 2000 MG: 40 INJECTION, SOLUTION INTRAVENOUS at 13:06

## 2024-12-23 RX ADMIN — SODIUM CHLORIDE 2.5 MILLION UNITS: 9 INJECTION, SOLUTION INTRAVENOUS at 20:28

## 2024-12-23 RX ADMIN — MAGNESIUM SULFATE IN WATER 2000 MG/HR: 20 INJECTION, SOLUTION INTRAVENOUS at 22:31

## 2024-12-23 RX ADMIN — LIDOCAINE HYDROCHLORIDE AND EPINEPHRINE 3 ML: 15; 5 INJECTION, SOLUTION EPIDURAL at 15:36

## 2024-12-23 NOTE — H&P
Obstetrics Admission H&P    Buffy Montano  [id number]  2005    Chief Complaint:  Pregnancy and  labor    HPI: 18 y.o. female  28w3d with Estimated Date of Delivery: 3/14/25  Pregnancy has been complicated by  prenatal care onset at 15wks, admitted to hospital at 27wks due to influenza and pneumonia . Patient complains of  painful contractions   for 1 weeks. Patient denies fever, nausea and vomiting, vaginal bleeding , and vaginal leaking of fluid .    Seen in the office by Dr. Go c/o painful contractions. FFN sent and returned positive.   Since arrival here she was given BMZ, started on PCN for GBS unknown and Magnesium for neuro-protection.     She says since arrival her contractions have spaced out a bit     ROS:  Pertinent items are noted in HPI.    OB History          1    Para        Term                AB        Living             SAB        IAB        Ectopic        Molar        Multiple        Live Births                  Past Medical History:   Diagnosis Date    Asthma     PRN- using while sick    Ill-defined condition     Group B strep at birth 16 days in NICU     No past surgical history on file.  Social History     Socioeconomic History    Marital status: Single     Spouse name: Not on file    Number of children: Not on file    Years of education: Not on file    Highest education level: Not on file   Occupational History    Not on file   Tobacco Use    Smoking status: Never    Smokeless tobacco: Never   Vaping Use    Vaping status: Never Used   Substance and Sexual Activity    Alcohol use: No    Drug use: No    Sexual activity: Not on file   Other Topics Concern    Not on file   Social History Narrative    Not on file     Social Determinants of Health     Financial Resource Strain: Low Risk  (10/3/2023)    Overall Financial Resource Strain (CARDIA)     Difficulty of Paying Living Expenses: Not hard at all   Food Insecurity: No Food Insecurity (2024)

## 2024-12-23 NOTE — ANESTHESIA PROCEDURE NOTES
Epidural Block    Patient location during procedure: OB  Start time: 12/23/2024 3:36 PM  End time: 12/23/2024 3:36 PM  Reason for block: labor epidural  Staffing  Performed: resident/CRNA   Anesthesiologist: Mikey Alvarado MD  Resident/CRNA: Rox Raphael APRN - CRNA  Performed by: Rox Raphael APRN - CRNA  Authorized by: Mikey Alvarado MD    Epidural  Patient position: sitting  Prep: ChloraPrep  Patient monitoring: frequent blood pressure checks and continuous pulse ox  Approach: midline  Location: L4-5  Injection technique: CR air  Provider prep: mask and sterile gloves  Needle  Needle type: Tuohy   Needle gauge: 17 G  Needle length: 3.5 in  Needle insertion depth: 5 cm  Catheter type: multi-orifice  Catheter size: 20 G  Catheter at skin depth: 9 cm  Test dose: negativeCatheter Secured: tegaderm and tape  Assessment  Hemodynamics: stable  Attempts: 1  Outcomes: uncomplicated and patient tolerated procedure well  Preanesthetic Checklist  Completed: patient identified, IV checked, site marked, risks and benefits discussed, surgical/procedural consents, equipment checked, pre-op evaluation, timeout performed, anesthesia consent given, oxygen available, monitors applied/VS acknowledged, fire risk safety assessment completed and verbalized and blood product R/B/A discussed and consented

## 2024-12-23 NOTE — ANESTHESIA PRE PROCEDURE
Department of Anesthesiology  Preprocedure Note       Name:  Buffy Montano   Age:  18 y.o.  :  2005                                          MRN:  [id number]         Date:  2024      Surgeon: * No surgeons listed *    Procedure: * No procedures listed *    Medications prior to admission:   Prior to Admission medications    Medication Sig Start Date End Date Taking? Authorizing Provider   albuterol sulfate HFA (PROVENTIL;VENTOLIN;PROAIR) 108 (90 Base) MCG/ACT inhaler Inhale 1 puff into the lungs every 6 hours as needed for Wheezing or Shortness of Breath    Automatic Reconciliation, Ar       Current medications:    Current Facility-Administered Medications   Medication Dose Route Frequency Provider Last Rate Last Admin   • methylergonovine (METHERGINE) injection 200 mcg  200 mcg IntraMUSCular PRN America Brothers DO       • carboprost (HEMABATE) injection 250 mcg  250 mcg IntraMUSCular PRN America Brothers DO       • miSOPROStol (CYTOTEC) tablet 400 mcg  400 mcg Buccal PRN America Brothers DO       • tranexamic acid-NaCl IVPB premix 1,000 mg  1,000 mg IntraVENous Once PRN America Brothers DO       • oxytocin (PITOCIN) 30 units in 500 mL infusion  87.3 dilan-units/min IntraVENous Continuous PRN America Brothers DO        And   • oxytocin (PITOCIN) 10 unit bolus from the bag  10 Units IntraVENous PRN America Brothers DO       • ondansetron (ZOFRAN) injection 4 mg  4 mg IntraVENous Q6H PRN America Brothers DO        Or   • ondansetron (ZOFRAN-ODT) disintegrating tablet 4 mg  4 mg Oral Q6H PRN America Brothers DO       • penicillin G potassium 2.5 million units in 0.9% sodium chloride 100 mL IVPB  2.5 Million Units IntraVENous Q4H America Brothers DO       • betamethasone acetate-betamethasone sodium phosphate (CELESTONE) injection 12 mg  12 mg IntraMUSCular Daily America Brothers DO   12 mg at 24 1345   • magnesium sulfate (20662 mg/500mL infusion)  2,000 mg/hr

## 2024-12-24 LAB — T PALLIDUM AB SER QL IA: NON REACTIVE

## 2024-12-24 PROCEDURE — 51702 INSERT TEMP BLADDER CATH: CPT

## 2024-12-24 PROCEDURE — 2580000003 HC RX 258: Performed by: STUDENT IN AN ORGANIZED HEALTH CARE EDUCATION/TRAINING PROGRAM

## 2024-12-24 PROCEDURE — 1100000000 HC RM PRIVATE

## 2024-12-24 PROCEDURE — 6360000002 HC RX W HCPCS: Performed by: STUDENT IN AN ORGANIZED HEALTH CARE EDUCATION/TRAINING PROGRAM

## 2024-12-24 PROCEDURE — 94761 N-INVAS EAR/PLS OXIMETRY MLT: CPT

## 2024-12-24 PROCEDURE — 2500000003 HC RX 250 WO HCPCS: Performed by: NURSE ANESTHETIST, CERTIFIED REGISTERED

## 2024-12-24 PROCEDURE — 6370000000 HC RX 637 (ALT 250 FOR IP): Performed by: STUDENT IN AN ORGANIZED HEALTH CARE EDUCATION/TRAINING PROGRAM

## 2024-12-24 RX ADMIN — SODIUM CHLORIDE 2.5 MILLION UNITS: 9 INJECTION, SOLUTION INTRAVENOUS at 00:21

## 2024-12-24 RX ADMIN — ONDANSETRON 4 MG: 4 TABLET, ORALLY DISINTEGRATING ORAL at 09:05

## 2024-12-24 RX ADMIN — Medication 10 ML/HR: at 17:56

## 2024-12-24 RX ADMIN — MAGNESIUM SULFATE IN WATER 2000 MG/HR: 20 INJECTION, SOLUTION INTRAVENOUS at 09:59

## 2024-12-24 RX ADMIN — BETAMETHASONE ACETATE AND BETAMETHASONE SODIUM PHOSPHATE 12 MG: 3; 3 INJECTION, SUSPENSION INTRA-ARTICULAR; INTRALESIONAL; INTRAMUSCULAR; SOFT TISSUE at 13:43

## 2024-12-24 RX ADMIN — SODIUM CHLORIDE 2.5 MILLION UNITS: 9 INJECTION, SOLUTION INTRAVENOUS at 04:32

## 2024-12-24 RX ADMIN — SODIUM CHLORIDE 2.5 MILLION UNITS: 9 INJECTION, SOLUTION INTRAVENOUS at 08:15

## 2024-12-24 RX ADMIN — SODIUM CHLORIDE 2.5 MILLION UNITS: 9 INJECTION, SOLUTION INTRAVENOUS at 22:28

## 2024-12-24 RX ADMIN — Medication 10 ML/HR: at 09:30

## 2024-12-24 RX ADMIN — Medication 10 ML/HR: at 00:18

## 2024-12-24 RX ADMIN — SODIUM CHLORIDE 2.5 MILLION UNITS: 9 INJECTION, SOLUTION INTRAVENOUS at 14:19

## 2024-12-24 RX ADMIN — SODIUM CHLORIDE 2.5 MILLION UNITS: 9 INJECTION, SOLUTION INTRAVENOUS at 18:13

## 2024-12-24 NOTE — CONSULTS
Consultation requested by Dr. Nunes for management of PTL.     18 year old  @ 28w4d admitted yesterday with contractions.     fFN positive and last exam /2.   Cervical exam has been stable and PTL arrested.     Pregnancy course has been significant for admissino - for pneumonia     Vitals:    24 1323   BP: (!) 99/57   Pulse: 77   Resp: 17   Temp:    SpO2: 98%     Tracing reviewed and reassuring.   Labs reviewed and WNL, GBS pending.   Mild anemia with normal MCV     18 year old  @ 28w4d in  labor now arrested.   S/p beta and NICU consultation.   Mag completed today for neuroprotection.   PCN for GBS prophylaxis. GBS pending.   Fetal tracing is reassuring.   After magnesium completion, recommend expectant management.   If still undelivered on , recommend bedside MFM ultrasound and consultion.     DONG Randlal M.D.    [facility]

## 2024-12-24 NOTE — PLAN OF CARE
Problem: Pain  Goal: Verbalizes/displays adequate comfort level or baseline comfort level  2024 by Omar Díaz RN  Outcome: Progressing  2024 1635 by Shena Morales RN  Outcome: Progressing     Problem: Vaginal Birth or  Section  Goal: Fetal and maternal status remain reassuring during the birth process  Description:  Birth OB-Pregnancy care plan goal which identifies if the fetal and maternal status remain reassuring during the birth process  2024 by Omar Díaz RN  Outcome: Progressing  2024 1635 by Shena Morales RN  Outcome: Progressing     Problem: Postpartum  Goal: Experiences normal postpartum course  Description:  Postpartum OB-Pregnancy care plan goal which identifies if the mother is experiencing a normal postpartum course  Outcome: Progressing  Goal: Appropriate maternal -  bonding  Description:  Postpartum OB-Pregnancy care plan goal which identifies if the mother and  are bonding appropriately  Outcome: Progressing  Goal: Establishment of infant feeding pattern  Description:  Postpartum OB-Pregnancy care plan goal which identifies if the mother is establishing a feeding pattern with their   Outcome: Progressing  Goal: Incisions, wounds, or drain sites healing without S/S of infection  Outcome: Progressing     Problem: Infection - Adult  Goal: Absence of infection at discharge  2024 by Omar Díaz RN  Outcome: Progressing  2024 1635 by Shena Morales RN  Outcome: Progressing  Goal: Absence of infection during hospitalization  Outcome: Progressing  Goal: Absence of fever/infection during anticipated neutropenic period  Outcome: Progressing     Problem: Safety - Adult  Goal: Free from fall injury  Outcome: Progressing     Problem: Discharge Planning  Goal: Discharge to home or other facility with appropriate resources  Outcome: Progressing     Problem: Chronic Conditions and Co-morbidities  Goal: Patient's chronic

## 2024-12-25 LAB
GP B STREP DNA SPEC QL NAA+PROBE: NEGATIVE
SPECIMEN SOURCE: NORMAL

## 2024-12-25 PROCEDURE — 2580000003 HC RX 258: Performed by: STUDENT IN AN ORGANIZED HEALTH CARE EDUCATION/TRAINING PROGRAM

## 2024-12-25 PROCEDURE — 1100000000 HC RM PRIVATE

## 2024-12-25 PROCEDURE — 6370000000 HC RX 637 (ALT 250 FOR IP): Performed by: OBSTETRICS & GYNECOLOGY

## 2024-12-25 PROCEDURE — 94761 N-INVAS EAR/PLS OXIMETRY MLT: CPT

## 2024-12-25 PROCEDURE — 2500000003 HC RX 250 WO HCPCS: Performed by: NURSE ANESTHETIST, CERTIFIED REGISTERED

## 2024-12-25 RX ORDER — DOCUSATE SODIUM 100 MG/1
100 CAPSULE, LIQUID FILLED ORAL 2 TIMES DAILY
Status: DISCONTINUED | OUTPATIENT
Start: 2024-12-25 | End: 2024-12-29

## 2024-12-25 RX ORDER — POLYETHYLENE GLYCOL 3350 17 G/17G
17 POWDER, FOR SOLUTION ORAL DAILY PRN
Status: DISCONTINUED | OUTPATIENT
Start: 2024-12-25 | End: 2025-01-01 | Stop reason: HOSPADM

## 2024-12-25 RX ADMIN — SODIUM CHLORIDE, POTASSIUM CHLORIDE, SODIUM LACTATE AND CALCIUM CHLORIDE: 600; 310; 30; 20 INJECTION, SOLUTION INTRAVENOUS at 08:21

## 2024-12-25 RX ADMIN — POLYETHYLENE GLYCOL 3350 17 G: 17 POWDER, FOR SOLUTION ORAL at 18:27

## 2024-12-25 RX ADMIN — DOCUSATE SODIUM 100 MG: 100 CAPSULE, LIQUID FILLED ORAL at 18:27

## 2024-12-25 RX ADMIN — Medication 8 ML/HR: at 03:09

## 2024-12-26 LAB
APPEARANCE UR: CLEAR
BACTERIA URNS QL MICRO: NEGATIVE /HPF
BILIRUB UR QL: NEGATIVE
COLOR UR: NORMAL
EPITH CASTS URNS QL MICRO: NORMAL /LPF
GLUCOSE UR STRIP.AUTO-MCNC: NEGATIVE MG/DL
HGB UR QL STRIP: NEGATIVE
HYALINE CASTS URNS QL MICRO: NORMAL /LPF (ref 0–2)
KETONES UR QL STRIP.AUTO: NEGATIVE MG/DL
LEUKOCYTE ESTERASE UR QL STRIP.AUTO: NEGATIVE
NITRITE UR QL STRIP.AUTO: NEGATIVE
PH UR STRIP: 8 (ref 5–8)
PROT UR STRIP-MCNC: NEGATIVE MG/DL
RBC #/AREA URNS HPF: NORMAL /HPF (ref 0–5)
SP GR UR REFRACTOMETRY: 1.01 (ref 1–1.03)
URINE CULTURE IF INDICATED: NORMAL
UROBILINOGEN UR QL STRIP.AUTO: 0.2 EU/DL (ref 0.2–1)
WBC URNS QL MICRO: NORMAL /HPF (ref 0–4)

## 2024-12-26 PROCEDURE — 59025 FETAL NON-STRESS TEST: CPT

## 2024-12-26 PROCEDURE — 81001 URINALYSIS AUTO W/SCOPE: CPT

## 2024-12-26 PROCEDURE — 1100000000 HC RM PRIVATE

## 2024-12-26 PROCEDURE — 94761 N-INVAS EAR/PLS OXIMETRY MLT: CPT

## 2024-12-27 PROCEDURE — 76819 FETAL BIOPHYS PROFIL W/O NST: CPT | Performed by: STUDENT IN AN ORGANIZED HEALTH CARE EDUCATION/TRAINING PROGRAM

## 2024-12-27 PROCEDURE — 94761 N-INVAS EAR/PLS OXIMETRY MLT: CPT

## 2024-12-27 PROCEDURE — 99222 1ST HOSP IP/OBS MODERATE 55: CPT | Performed by: STUDENT IN AN ORGANIZED HEALTH CARE EDUCATION/TRAINING PROGRAM

## 2024-12-27 PROCEDURE — 59025 FETAL NON-STRESS TEST: CPT

## 2024-12-27 PROCEDURE — 1100000000 HC RM PRIVATE

## 2024-12-27 PROCEDURE — 76811 OB US DETAILED SNGL FETUS: CPT | Performed by: STUDENT IN AN ORGANIZED HEALTH CARE EDUCATION/TRAINING PROGRAM

## 2024-12-27 PROCEDURE — 76820 UMBILICAL ARTERY ECHO: CPT | Performed by: STUDENT IN AN ORGANIZED HEALTH CARE EDUCATION/TRAINING PROGRAM

## 2024-12-27 NOTE — CONSULTS
Please look under media to view full consult and ultrasound report in ViewPoint.     Buffy Montano , was evaluated through a synchronous (real-time) audio-video encounter.     The patient (or guardian if applicable) is aware that this is a billable service, which includes applicable co-pays. This Virtual Visit was conducted with patient's (and/or legal guardian's) consent. Patient identification was verified, and a caregiver was present when appropriate.     This patient was located at home facility [facility] [address] [place] [address].     Provider was located at facility [facility] [address] [place] [address] [address] [place] [address].     Confirm you are appropriately licensed, registered, or certified to deliver care in the state where the patient is located as indicated above. If you are not or unsure, please re-schedule the visit: Yes, I confirm.    Yasmeen Tsai MD   Maternal Fetal Medicine

## 2024-12-28 PROCEDURE — 1100000000 HC RM PRIVATE

## 2024-12-28 PROCEDURE — 94761 N-INVAS EAR/PLS OXIMETRY MLT: CPT

## 2024-12-29 LAB
BASOPHILS # BLD: 0.1 K/UL (ref 0–0.1)
BASOPHILS NFR BLD: 0 % (ref 0–1)
DIFFERENTIAL METHOD BLD: ABNORMAL
EOSINOPHIL # BLD: 0.2 K/UL (ref 0–0.4)
EOSINOPHIL NFR BLD: 1 % (ref 0–7)
ERYTHROCYTE [DISTWIDTH] IN BLOOD BY AUTOMATED COUNT: 13.1 % (ref 11.5–14.5)
HCT VFR BLD AUTO: 31.5 % (ref 35–47)
HGB BLD-MCNC: 10.8 G/DL (ref 11.5–16)
IMM GRANULOCYTES # BLD AUTO: 0.3 K/UL (ref 0–0.04)
IMM GRANULOCYTES NFR BLD AUTO: 2 % (ref 0–0.5)
LYMPHOCYTES # BLD: 2.4 K/UL (ref 0.8–3.5)
LYMPHOCYTES NFR BLD: 13 % (ref 12–49)
MCH RBC QN AUTO: 31.3 PG (ref 26–34)
MCHC RBC AUTO-ENTMCNC: 34.3 G/DL (ref 30–36.5)
MCV RBC AUTO: 91.3 FL (ref 80–99)
MONOCYTES # BLD: 1.1 K/UL (ref 0–1)
MONOCYTES NFR BLD: 6 % (ref 5–13)
NEUTS SEG # BLD: 13.8 K/UL (ref 1.8–8)
NEUTS SEG NFR BLD: 78 % (ref 32–75)
NRBC # BLD: 0 K/UL (ref 0–0.01)
NRBC BLD-RTO: 0 PER 100 WBC
PLATELET # BLD AUTO: 409 K/UL (ref 150–400)
PMV BLD AUTO: 9.2 FL (ref 8.9–12.9)
RBC # BLD AUTO: 3.45 M/UL (ref 3.8–5.2)
WBC # BLD AUTO: 17.8 K/UL (ref 3.6–11)

## 2024-12-29 PROCEDURE — 36415 COLL VENOUS BLD VENIPUNCTURE: CPT

## 2024-12-29 PROCEDURE — 3700000025 EPIDURAL BLOCK: Performed by: ANESTHESIOLOGY

## 2024-12-29 PROCEDURE — 86901 BLOOD TYPING SEROLOGIC RH(D): CPT

## 2024-12-29 PROCEDURE — 94761 N-INVAS EAR/PLS OXIMETRY MLT: CPT

## 2024-12-29 PROCEDURE — 6360000002 HC RX W HCPCS

## 2024-12-29 PROCEDURE — 85025 COMPLETE CBC W/AUTO DIFF WBC: CPT

## 2024-12-29 PROCEDURE — 51702 INSERT TEMP BLADDER CATH: CPT

## 2024-12-29 PROCEDURE — 6360000002 HC RX W HCPCS: Performed by: ANESTHESIOLOGY

## 2024-12-29 PROCEDURE — 59025 FETAL NON-STRESS TEST: CPT

## 2024-12-29 PROCEDURE — 6360000002 HC RX W HCPCS: Performed by: OBSTETRICS & GYNECOLOGY

## 2024-12-29 PROCEDURE — 00HU33Z INSERTION OF INFUSION DEVICE INTO SPINAL CANAL, PERCUTANEOUS APPROACH: ICD-10-PCS | Performed by: ANESTHESIOLOGY

## 2024-12-29 PROCEDURE — 1100000000 HC RM PRIVATE

## 2024-12-29 PROCEDURE — 86850 RBC ANTIBODY SCREEN: CPT

## 2024-12-29 PROCEDURE — 86900 BLOOD TYPING SEROLOGIC ABO: CPT

## 2024-12-29 RX ORDER — SODIUM CHLORIDE 0.9 % (FLUSH) 0.9 %
5-40 SYRINGE (ML) INJECTION PRN
Status: DISCONTINUED | OUTPATIENT
Start: 2024-12-29 | End: 2024-12-30

## 2024-12-29 RX ORDER — ONDANSETRON 2 MG/ML
4 INJECTION INTRAMUSCULAR; INTRAVENOUS EVERY 6 HOURS PRN
Status: DISCONTINUED | OUTPATIENT
Start: 2024-12-29 | End: 2025-01-01 | Stop reason: HOSPADM

## 2024-12-29 RX ORDER — LIDOCAINE HCL/EPINEPHRINE/PF 2%-1:200K
VIAL (ML) INJECTION
Status: DISCONTINUED | OUTPATIENT
Start: 2024-12-29 | End: 2024-12-30 | Stop reason: SDUPTHER

## 2024-12-29 RX ORDER — MAGNESIUM SULFATE IN WATER 40 MG/ML
INJECTION, SOLUTION INTRAVENOUS
Status: COMPLETED
Start: 2024-12-29 | End: 2024-12-30

## 2024-12-29 RX ORDER — ACETAMINOPHEN 325 MG/1
650 TABLET ORAL EVERY 4 HOURS PRN
Status: DISCONTINUED | OUTPATIENT
Start: 2024-12-29 | End: 2025-01-01 | Stop reason: HOSPADM

## 2024-12-29 RX ORDER — BUPIVACAINE HYDROCHLORIDE 2.5 MG/ML
INJECTION, SOLUTION EPIDURAL; INFILTRATION; INTRACAUDAL
Status: DISCONTINUED | OUTPATIENT
Start: 2024-12-29 | End: 2024-12-30 | Stop reason: SDUPTHER

## 2024-12-29 RX ORDER — SODIUM CHLORIDE 9 MG/ML
25 INJECTION, SOLUTION INTRAVENOUS PRN
Status: DISCONTINUED | OUTPATIENT
Start: 2024-12-29 | End: 2024-12-30

## 2024-12-29 RX ORDER — MAGNESIUM SULFATE IN WATER 40 MG/ML
INJECTION, SOLUTION INTRAVENOUS
Status: COMPLETED
Start: 2024-12-29 | End: 2024-12-29

## 2024-12-29 RX ORDER — NALOXONE HYDROCHLORIDE 0.4 MG/ML
INJECTION, SOLUTION INTRAMUSCULAR; INTRAVENOUS; SUBCUTANEOUS PRN
Status: DISCONTINUED | OUTPATIENT
Start: 2024-12-29 | End: 2024-12-30

## 2024-12-29 RX ORDER — EPHEDRINE SULFATE/0.9% NACL/PF 25 MG/5 ML
5 SYRINGE (ML) INTRAVENOUS PRN
Status: DISCONTINUED | OUTPATIENT
Start: 2024-12-30 | End: 2025-01-01 | Stop reason: HOSPADM

## 2024-12-29 RX ORDER — SODIUM CHLORIDE 0.9 % (FLUSH) 0.9 %
5-40 SYRINGE (ML) INJECTION EVERY 12 HOURS SCHEDULED
Status: DISCONTINUED | OUTPATIENT
Start: 2024-12-29 | End: 2024-12-30

## 2024-12-29 RX ORDER — MAGNESIUM SULFATE IN WATER 40 MG/ML
2000 INJECTION, SOLUTION INTRAVENOUS ONCE
Status: COMPLETED | OUTPATIENT
Start: 2024-12-29 | End: 2024-12-29

## 2024-12-29 RX ORDER — ACETAMINOPHEN 650 MG/1
650 SUPPOSITORY RECTAL EVERY 4 HOURS PRN
Status: DISCONTINUED | OUTPATIENT
Start: 2024-12-29 | End: 2025-01-01 | Stop reason: HOSPADM

## 2024-12-29 RX ORDER — DIPHENHYDRAMINE HCL 25 MG
25 CAPSULE ORAL EVERY 4 HOURS PRN
Status: DISCONTINUED | OUTPATIENT
Start: 2024-12-29 | End: 2024-12-30

## 2024-12-29 RX ORDER — MAGNESIUM SULFATE IN WATER 40 MG/ML
4000 INJECTION, SOLUTION INTRAVENOUS ONCE
Status: COMPLETED | OUTPATIENT
Start: 2024-12-29 | End: 2024-12-30

## 2024-12-29 RX ORDER — EPHEDRINE SULFATE/0.9% NACL/PF 25 MG/5 ML
10 SYRINGE (ML) INTRAVENOUS
Status: DISPENSED | OUTPATIENT
Start: 2024-12-29 | End: 2024-12-30

## 2024-12-29 RX ORDER — FENTANYL/BUPIVACAINE/NS/PF 2-1250MCG
1-15 PLASTIC BAG, INJECTION (ML) INJECTION CONTINUOUS
Status: DISCONTINUED | OUTPATIENT
Start: 2024-12-29 | End: 2025-01-01 | Stop reason: HOSPADM

## 2024-12-29 RX ADMIN — MAGNESIUM SULFATE IN WATER 4000 MG: 40 INJECTION, SOLUTION INTRAVENOUS at 22:15

## 2024-12-29 RX ADMIN — MAGNESIUM SULFATE HEPTAHYDRATE 2000 MG: 40 INJECTION, SOLUTION INTRAVENOUS at 22:37

## 2024-12-29 RX ADMIN — MAGNESIUM SULFATE IN WATER 2000 MG: 40 INJECTION, SOLUTION INTRAVENOUS at 22:37

## 2024-12-29 RX ADMIN — MAGNESIUM SULFATE IN WATER 2000 MG/HR: 20 INJECTION, SOLUTION INTRAVENOUS at 23:04

## 2024-12-29 RX ADMIN — LIDOCAINE HYDROCHLORIDE,EPINEPHRINE BITARTRATE 2 ML: 20; .005 INJECTION, SOLUTION EPIDURAL; INFILTRATION; INTRACAUDAL; PERINEURAL at 23:01

## 2024-12-29 RX ADMIN — MAGNESIUM SULFATE HEPTAHYDRATE 4000 MG: 40 INJECTION, SOLUTION INTRAVENOUS at 22:15

## 2024-12-29 RX ADMIN — BUPIVACAINE HYDROCHLORIDE 6 ML: 2.5 INJECTION, SOLUTION EPIDURAL; INFILTRATION; INTRACAUDAL at 23:05

## 2024-12-30 PROBLEM — O47.00 PRETERM CONTRACTIONS: Status: RESOLVED | Noted: 2024-12-17 | Resolved: 2024-12-30

## 2024-12-30 LAB
ABO + RH BLD: NORMAL
BLOOD GROUP ANTIBODIES SERPL: NORMAL
SPECIMEN EXP DATE BLD: NORMAL

## 2024-12-30 PROCEDURE — 1120000000 HC RM PRIVATE OB

## 2024-12-30 PROCEDURE — 94761 N-INVAS EAR/PLS OXIMETRY MLT: CPT

## 2024-12-30 PROCEDURE — 88307 TISSUE EXAM BY PATHOLOGIST: CPT

## 2024-12-30 PROCEDURE — 7210000100 HC LABOR FEE PER 1 HR: Performed by: OBSTETRICS & GYNECOLOGY

## 2024-12-30 PROCEDURE — 7220000101 HC DELIVERY VAGINAL/SINGLE: Performed by: OBSTETRICS & GYNECOLOGY

## 2024-12-30 PROCEDURE — 6370000000 HC RX 637 (ALT 250 FOR IP): Performed by: OBSTETRICS & GYNECOLOGY

## 2024-12-30 PROCEDURE — 2580000003 HC RX 258

## 2024-12-30 PROCEDURE — 6360000002 HC RX W HCPCS

## 2024-12-30 PROCEDURE — 99465 NB RESUSCITATION: CPT

## 2024-12-30 RX ORDER — CARBOPROST TROMETHAMINE 250 UG/ML
250 INJECTION, SOLUTION INTRAMUSCULAR PRN
Status: DISCONTINUED | OUTPATIENT
Start: 2024-12-30 | End: 2025-01-01 | Stop reason: HOSPADM

## 2024-12-30 RX ORDER — ONDANSETRON 4 MG/1
4 TABLET, ORALLY DISINTEGRATING ORAL EVERY 6 HOURS PRN
Status: DISCONTINUED | OUTPATIENT
Start: 2024-12-30 | End: 2025-01-01 | Stop reason: HOSPADM

## 2024-12-30 RX ORDER — SODIUM CHLORIDE, SODIUM LACTATE, POTASSIUM CHLORIDE, CALCIUM CHLORIDE 600; 310; 30; 20 MG/100ML; MG/100ML; MG/100ML; MG/100ML
INJECTION, SOLUTION INTRAVENOUS CONTINUOUS
Status: DISCONTINUED | OUTPATIENT
Start: 2024-12-30 | End: 2024-12-30

## 2024-12-30 RX ORDER — IBUPROFEN 800 MG/1
800 TABLET, FILM COATED ORAL EVERY 8 HOURS
Status: DISCONTINUED | OUTPATIENT
Start: 2024-12-30 | End: 2025-01-01 | Stop reason: HOSPADM

## 2024-12-30 RX ORDER — MISOPROSTOL 200 UG/1
800 TABLET ORAL PRN
Status: DISCONTINUED | OUTPATIENT
Start: 2024-12-30 | End: 2025-01-01 | Stop reason: HOSPADM

## 2024-12-30 RX ORDER — FERROUS SULFATE 325(65) MG
325 TABLET ORAL EVERY OTHER DAY
Status: DISCONTINUED | OUTPATIENT
Start: 2024-12-30 | End: 2025-01-01 | Stop reason: HOSPADM

## 2024-12-30 RX ORDER — SODIUM CHLORIDE, SODIUM LACTATE, POTASSIUM CHLORIDE, AND CALCIUM CHLORIDE .6; .31; .03; .02 G/100ML; G/100ML; G/100ML; G/100ML
500 INJECTION, SOLUTION INTRAVENOUS PRN
Status: DISCONTINUED | OUTPATIENT
Start: 2024-12-30 | End: 2025-01-01 | Stop reason: HOSPADM

## 2024-12-30 RX ORDER — DOCUSATE SODIUM 100 MG/1
100 CAPSULE, LIQUID FILLED ORAL 2 TIMES DAILY
Status: DISCONTINUED | OUTPATIENT
Start: 2024-12-30 | End: 2025-01-01 | Stop reason: HOSPADM

## 2024-12-30 RX ORDER — METHYLERGONOVINE MALEATE 0.2 MG/ML
200 INJECTION INTRAVENOUS PRN
Status: DISCONTINUED | OUTPATIENT
Start: 2024-12-30 | End: 2025-01-01 | Stop reason: HOSPADM

## 2024-12-30 RX ORDER — IBUPROFEN 800 MG/1
800 TABLET, FILM COATED ORAL EVERY 8 HOURS SCHEDULED
Status: DISCONTINUED | OUTPATIENT
Start: 2024-12-30 | End: 2024-12-30

## 2024-12-30 RX ORDER — SODIUM CHLORIDE 0.9 % (FLUSH) 0.9 %
5-40 SYRINGE (ML) INJECTION EVERY 12 HOURS SCHEDULED
Status: DISCONTINUED | OUTPATIENT
Start: 2024-12-30 | End: 2025-01-01 | Stop reason: HOSPADM

## 2024-12-30 RX ORDER — ACETAMINOPHEN 500 MG
1000 TABLET ORAL EVERY 8 HOURS SCHEDULED
Status: DISCONTINUED | OUTPATIENT
Start: 2024-12-30 | End: 2025-01-01 | Stop reason: HOSPADM

## 2024-12-30 RX ORDER — ONDANSETRON 2 MG/ML
4 INJECTION INTRAMUSCULAR; INTRAVENOUS EVERY 6 HOURS PRN
Status: DISCONTINUED | OUTPATIENT
Start: 2024-12-30 | End: 2025-01-01 | Stop reason: HOSPADM

## 2024-12-30 RX ORDER — TRANEXAMIC ACID 10 MG/ML
1000 INJECTION, SOLUTION INTRAVENOUS
Status: ACTIVE | OUTPATIENT
Start: 2024-12-30 | End: 2024-12-31

## 2024-12-30 RX ORDER — MISOPROSTOL 200 UG/1
400 TABLET ORAL PRN
Status: DISCONTINUED | OUTPATIENT
Start: 2024-12-30 | End: 2025-01-01 | Stop reason: HOSPADM

## 2024-12-30 RX ORDER — SODIUM CHLORIDE 9 MG/ML
INJECTION, SOLUTION INTRAVENOUS PRN
Status: DISCONTINUED | OUTPATIENT
Start: 2024-12-30 | End: 2025-01-01 | Stop reason: HOSPADM

## 2024-12-30 RX ORDER — SODIUM CHLORIDE 0.9 % (FLUSH) 0.9 %
5-40 SYRINGE (ML) INJECTION PRN
Status: DISCONTINUED | OUTPATIENT
Start: 2024-12-30 | End: 2025-01-01 | Stop reason: HOSPADM

## 2024-12-30 RX ADMIN — OXYTOCIN 999 MILLI-UNITS/MIN: 10 INJECTION, SOLUTION INTRAMUSCULAR; INTRAVENOUS at 00:15

## 2024-12-30 RX ADMIN — IBUPROFEN 800 MG: 800 TABLET, FILM COATED ORAL at 01:19

## 2024-12-30 RX ADMIN — DOCUSATE SODIUM 100 MG: 100 CAPSULE, LIQUID FILLED ORAL at 01:19

## 2024-12-30 RX ADMIN — Medication 999 MILLI-UNITS/MIN: at 00:15

## 2024-12-30 ASSESSMENT — PAIN SCALES - GENERAL: PAINLEVEL_OUTOF10: 0

## 2024-12-30 NOTE — CARE COORDINATION
to discuss the discharge plan with any other family members/significant others, and if so, who? No   Financial Resources Medicaid     Gonzalo HerreraCM

## 2024-12-30 NOTE — DISCHARGE SUMMARY
Obstetrical Discharge Summary     Name: Buffy Montano MRN: [id number]  SSN: [id number]    YOB: 2005  Age: 19 y.o.  Sex: female      Admit Date: 2024    Discharge Date: 2025     Attending Physician:  America Brothers DO     Delivering Physician:  MD Alcon     * Admission Diagnoses:   IUP @ 29w3d  Fetal growth restriction   labor      * Discharge Diagnoses:   Delivery of a viable infant via  by MD Alcon on 2024.    Same as above       Additional Diagnoses:      No results found for: \"RUBELLAEXT\", \"GRBSEXT\"   There is no immunization history for the selected administration types on file for this patient.    * Procedures:           * Discharge Condition: good    * Hospital Course: Normal hospital course following the delivery.    * Disposition: Home    Discharge Medications:      Medication List        ASK your doctor about these medications      albuterol sulfate  (90 Base) MCG/ACT inhaler  Commonly known as: PROVENTIL;VENTOLIN;PROAIR              * Follow-up Care/Patient Instructions:  Activity: Activity as tolerated  Diet: Regular Diet  Wound Care: As directed  Followup 1 week for mood check        Signed By:  Denisse Lucas MD     2024

## 2024-12-30 NOTE — ANESTHESIA PROCEDURE NOTES
Epidural Block    Patient location during procedure: OB  Start time: 12/29/2024 10:51 PM  End time: 12/29/2024 11:11 PM  Reason for block: labor epidural  Staffing  Anesthesiologist: Marcus Fisher DO  Performed by: Marcus Fisher DO  Authorized by: Mikey Alvarado MD    Epidural  Patient position: sitting  Prep: ChloraPrep and site prepped and draped  Patient monitoring: continuous pulse ox and frequent blood pressure checks  Approach: midline  Location: L3-4  Injection technique: CR air  Provider prep: mask  Needle  Needle type: Tuohy   Needle gauge: 17 G  Needle length: 3.5 in  Catheter type: end hole  Catheter size: 18 G  Test dose: negativeCatheter Secured: tegaderm and tape  Assessment  Sensory level: T10  Hemodynamics: stable  Attempts: 1  Outcomes: uncomplicated and patient tolerated procedure well  Preanesthetic Checklist  Completed: patient identified, IV checked, site marked, risks and benefits discussed, surgical/procedural consents, equipment checked, pre-op evaluation, timeout performed, anesthesia consent given, oxygen available, monitors applied/VS acknowledged, fire risk safety assessment completed and verbalized and blood product R/B/A discussed and consented

## 2024-12-30 NOTE — LACTATION NOTE
This note was copied from a baby's chart.  Baby in NICU born at 20 weeks. Set MOB up with breast pump; measures for appropriate flange size. CM getting patient a breast pump for discharge. LC encourages MOB to pump every 3 hours. Preparation and storage of milk discussed. All questions answered.    Infant admitted to NICU.  Pt will successfully establish breast milk supply by pumping with a hospital grade pump every 2-3 hours for approximately 20 minutes/8-10 x day.  To maximize milk production mom taught to incorporate breast massage before and hand expression after each pumping session.  All expressed breast milk (EBM) will be provided for infant(s) use.  The value of skin to skin bonding emphasized.  The breast will be offered as baby is ready; with the goal of eventual transition to breastfeeding. Importance of maintaining milk supply through pumping emphasized as infant(s) learns to nurse.

## 2024-12-30 NOTE — L&D DELIVERY NOTE
Imelda Montano [[id number]]      Labor Events     Labor: Yes   Steroids: Full Course  Cervical Ripening Date/Time:      Antibiotics Received during Labor: No  Rupture Identifier: Sac 1  Rupture Date/Time:  24 00:05:00   Rupture Type: Bulging  Fluid Color: Clear  Fluid Odor: None  Fluid Volume: Moderate  Induction: None  Augmentation: AROM  Labor Complications:  Labor       Anesthesia    Method: Epidural       Labor Event Times      Labor onset date/time:        Dilation complete date/time:  24 23:58:00 EST     Start pushing date/time:  2024 00:03:00   Decision date/time (emergent ):            Labor Length    2nd stage: 0h 12m  3rd stage: 0h 04m       Delivery Details      Delivery Date: 24 Delivery Time: 00:10:00   Delivery Type: Vaginal, Spontaneous               Presentation    Presentation: Vertex       Shoulder Dystocia    Shoulder Dystocia Present?: No       Assisted Delivery Details    Forceps Attempted?: No  Vacuum Extractor Attempted?: No                           Cord    Vessels: 3 Vessels  Complications: None  Delayed Cord Clamping?: Yes  Cord Clamped Date/Time: 2024 00:11:00  Cord Blood Disposition: Lab  Gases Sent?: No              Placenta    Date/Time: 2024 00:14:00  Removal: Expressed  Appearance: Intact  Disposition: Pathology       Lacerations    Episiotomy: None  Perineal Lacerations: None  Other Lacerations: no non-perineal laceration       Vaginal Counts    Initial Count Personnel: KARTHIK HINOJOSA RN  Initial Count Verified By: DR. SCHMIDT  Intial Sponge Count: Correct Intial Needles Count: Correct Intial Instruments Count: Correct   Final Sponges Count: Correct Final Needles  Count: Correct Final Instruments Count: Correct   Final Count Personnel: KARTHIK HINOJOSA RN  Final Count Verified By: DR. SCHMIDT  Accurate Final Count?: Yes       Blood Loss  Mother: Buffy Montano #[id number]     Start of Mother's Information

## 2024-12-30 NOTE — ANESTHESIA PRE PROCEDURE
Department of Anesthesiology  Preprocedure Note       Name:  Buffy Montano   Age:  19 y.o.  :  2005                                          MRN:  [id number]         Date:  2024      Surgeon: * No surgeons listed *    Procedure: * No procedures listed *    Medications prior to admission:   Prior to Admission medications    Medication Sig Start Date End Date Taking? Authorizing Provider   albuterol sulfate HFA (PROVENTIL;VENTOLIN;PROAIR) 108 (90 Base) MCG/ACT inhaler Inhale 1 puff into the lungs every 6 hours as needed for Wheezing or Shortness of Breath    Automatic Reconciliation, Ar       Current medications:    Current Facility-Administered Medications   Medication Dose Route Frequency Provider Last Rate Last Admin    oxytocin (PITOCIN) 30 units in 500 mL infusion Override Pull             fentaNYL 2 mcg/mL BUPivacaine 0.125% in sodium chloride 0.9% 100 mL epidural infusion  1-15 mL/hr Epidural Continuous Marcus Fisher DO        naloxone 0.4 mg in 10 mL sodium chloride syringe   IntraVENous PRN Marcus Fisher DO        ePHEDrine injection 10 mg  10 mg IntraVENous Once PRN Marcus Fisher DO        Followed by    [START ON 2024] ePHEDrine injection 5 mg  5 mg IntraVENous PRN Marcus Fisher DO        ondansetron (ZOFRAN) injection 4 mg  4 mg IntraVENous Q6H PRN Marcus Fisher DO        diphenhydrAMINE (BENADRYL) capsule 25 mg  25 mg Oral Q4H PRN Marcus Fisher DO        sodium chloride flush 0.9 % injection 5-40 mL  5-40 mL IntraVENous 2 times per day Kinga Rondon MD        sodium chloride flush 0.9 % injection 5-40 mL  5-40 mL IntraVENous PRN Kinga Rondon MD        0.9 % sodium chloride infusion  25 mL IntraVENous PRN Kinga Rondon MD        acetaminophen (TYLENOL) tablet 650 mg  650 mg Oral Q4H PRN Kinga Rondon MD        Or    acetaminophen (TYLENOL) suppository 650 mg  650 mg Rectal Q4H PRN iKnga Rondon MD        magnesium sulfate

## 2024-12-30 NOTE — ANESTHESIA POSTPROCEDURE EVALUATION
Department of Anesthesiology  Postprocedure Note    Patient: Buffy Montano  MRN: [id number]  YOB: 2005  Date of evaluation: 12/30/2024    Procedure Summary       Date: 12/23/24 Room / Location:     Anesthesia Start: 1527 Anesthesia Stop: 12/30/24 0010    Procedure: Labor Analgesia Diagnosis:     Scheduled Providers:  Responsible Provider: Mikey Alvarado MD    Anesthesia Type: epidural ASA Status: 2            Anesthesia Type: No value filed.    Pablo Phase I:      Pablo Phase II:      Anesthesia Post Evaluation    Patient location during evaluation: bedside  Level of consciousness: awake  Pain score: 0  Airway patency: patent  Nausea & Vomiting: no nausea  Cardiovascular status: hemodynamically stable  Respiratory status: acceptable  Hydration status: euvolemic  Pain management: adequate    No notable events documented.

## 2024-12-31 PROCEDURE — 1120000000 HC RM PRIVATE OB

## 2024-12-31 NOTE — LACTATION NOTE
This note was copied from a baby's chart.  Mother pumping at time of consult.  Mother states she has been getting some milk.  Mother has a [employer] Pump and Style for home use.  Mother measured at 18 mm for flange size, using a 21 mm flange.  Printed info on establishing a milk supply, storage of breast milk for a premie baby. and how milk production works.  Mothers questions answered.        Left Breast: Soft  Left Nipple: Protrude  Right Nipple: Protrude  Right Breast: Soft        Maternal Response: Relaxed and confident        Latch:  (baby in NICU, mother pumping)  Breast Care: Using breast pump

## 2025-01-01 VITALS
WEIGHT: 155 LBS | RESPIRATION RATE: 16 BRPM | BODY MASS INDEX: 28.52 KG/M2 | HEART RATE: 84 BPM | TEMPERATURE: 98.5 F | OXYGEN SATURATION: 100 % | HEIGHT: 62 IN | SYSTOLIC BLOOD PRESSURE: 116 MMHG | DIASTOLIC BLOOD PRESSURE: 74 MMHG

## 2025-01-01 NOTE — LACTATION NOTE
This note was copied from a baby's chart.  Mother reports that she pumped two bottles of milk this morning. She is pumping every 2-3 hours. LC encouraged mother to pump every 2-3 hours or pump at least 8-10 times in a 24 hour period including 2 sessions between 11PM and 5 AM. Hand expression encouraged with pumping sessions. Mother understanding. Flange size discussed, mother had a breast pump to take home. Engorgement management discussed as well as how to store her breast milk. Warm line and group information provided, mother to call for further lactation support if needed during her infant's stay.     Pumping:  Guidelines for pumping, milk collection and storage, proper cleaning of pump parts all reviewed.  How to establish and maintain breast milk supply through pumping reviewed.  Differences between hospital grade rental pumps vs store bought double electric/hand pumps discussed.  Set up pumping with double electric set up.  Assisted with pump session.   List of area pump rental locations and lactation support services provided.        Left Breast: Soft  Left Nipple: Protrude  Right Nipple: Protrude  Right Breast: Soft        Maternal Response: Relaxed and confident           Latch:  (baby in NICU, mother pumping)                 Breast Care: Pumping supply provided, Using breast pump  Care Plan Initiated: Other (Comment) (mother exclusively pumping)  Lactation Comment: Education provided for discharge

## 2025-01-01 NOTE — PROGRESS NOTES
AntePartum Progress Note    Buffy Montano  29w0d    Patient admitted for advanced cervical dilation at 29w0d Estimated Date of Delivery: 3/14/25 s    No contractions now  Baby moving  Vaginal pressure that has been the same as the last few days         Vitals:  Patient Vitals for the past 24 hrs:   BP Temp Temp src Pulse Resp SpO2   24 0753 (!) 111/57 97.8 °F (36.6 °C) Oral 74 16 --   24 1909 -- -- -- -- -- 99 %   24 1906 120/60 97.9 °F (36.6 °C) Oral 95 16 99 %   24 1430 -- 98.1 °F (36.7 °C) Oral -- -- --     Temp (24hrs), Av.9 °F (36.6 °C), Min:97.8 °F (36.6 °C), Max:98.1 °F (36.7 °C)    I&O:   No intake/output data recorded.             No intake/output data recorded.  NST:  pending this am   Uterine Activity: pending this am    Exam:  Patient without distress.               Lower extremities edema No                                           Labs: No results found for this or any previous visit (from the past 24 hour(s)).    Assessment and Plan:   IUP @ 29w0d with advanced cervical dilation; FGR 2nd %ile - INPATIENT UNTIL DELIVERY     Patient Active Problem List    Diagnosis Date Noted     labor in second trimester with  delivery in third trimester, single or unspecified fetus 2024    TIA (transient ischemic attack) 2024     contractions 2024     - sp BMZ x2  - sp mag for neuroprotection  - epidural catheter in place. Consider removal today   - SCDs  - GBS neg  - sp NICU consult  - s/p MFM consult - FGR 2nd %ile. Inpatient until delivery. Delivery at 37w unless indicated earlier (oligo/pprom - 34w delivery per MFM). Growth q3w. Weekly dopplers.   - weekly CBC, ordered.     
  Buffy Montano  [id number]  2005   28w6d      S:  Feeling comfortable in bed. Has been up some in room. Denies contractions, discharge, bleeding. Good FM.     O:    /69   Pulse 88   Temp 98.7 °F (37.1 °C) (Oral)   Resp 16   Ht 1.575 m (5' 2\")   Wt 70.3 kg (155 lb)   SpO2 (!) 88%   BMI 28.35 kg/m²        Cervix deferred    A/P:  18 y.o.  @ 28w6d- PTL     - sp BMZ x2  - sp mag for neuroprotection  - epidural catheter in place. Leave for now  - place SCDs while on modified bedrest  - GBS neg  - sp NICU consult  - MFM consult today    Dispo: remain in house for now      Soha Rodriguez MD  [facility]    
0700 sbar report received from abby fritz rnc    1640 md approved for pt not to have iv/iv lock.  Iv partially out and occluded. Iv removed with cathlon intact  
0700: Bedside and Verbal shift change report given to Melissa CARRERA RN (oncoming nurse) by Winter OCONNOR RN (offgoing nurse). Report included the following information Nurse Handoff Report, Adult Overview, Intake/Output, MAR, Recent Results, and Med Rec Status.     0836: This RN call to [facility] re scheduling consult appointment for today. Per , Dr. Tsai will to reach out to Dr. Jenkins re same.    0850: This RN call to Dr. Jenkins requesting perfectserve message to [facility] to schedule consult. Dr. Jenkins to send message.    1112: This RN transporting patient off unit to [facility] via wheelchair.    1320: Patient returned to room 215 via wheelchair.   
0700: Bedside and Verbal shift change report given to Odette RN (oncoming nurse) by Silvana RN (offgoing nurse). Report included the following information Adult Overview, Intake/Output, MAR, Recent Results, and Med Rec Status.     0830: Pt resting comfortably in bed. Reports new lower abdominal cramping that began around 0500, rates 0/10 pain but feels like period cramps. Feels more vaginal pressure. Denies LOF or vaginal bleeding. Does not want cervical exam at this time. EFM and toco placed and pt educated on when to report change in symptoms. Reports active FM.     0950: Pt reports cramping has resolved, vaginal pressure unchanged. EFM removed following reactive NST    1630: Pt reports mild cramping returned about an hour ago, not painful. Improves with moving around. Abdomen soft/non-tender. Placed on EFM for afternoon NST.     1730: Removing pt from EFM following NST when pt reported painful contraction several minutes prior, EFM replaced and toco adjusted. Pt informed to notify RN of continued contractions or worsening cramps/pain    1800: Dr Rondon notified of pt complaints of painful contractions and variable decel. Per MD, keep pt on monitor for now.     1915: Bedside and Verbal shift change report given to Silvana CLIFTON (oncoming nurse) by Odette CLIFTON (offgoing nurse). Report included the following information Adult Overview, Intake/Output, MAR, Recent Results, and Med Rec Status.     
0710: Bedside and Verbal shift change report given to Jens RN (oncoming nurse) by ELODIA Nelson (offgoing nurse). Report included the following information Nurse Handoff Report, Index, Adult Overview, Surgery Report, Intake/Output, MAR, and Recent Results.     0902:  at the bedside discussing plan of care with pt.     1406: Per  pt pitocin to be turned off at this time, and pt may eat.   
0725 Assumed care of patient.    0800 Comfortable with epidural. Denies H/A, visual disturbances, N/V or epigastric pain. Endorses +fetal movement. No vaginal bleeding. Le patent draining clear yellow urine. SCD's in place to BLE. Reports feeling crampy briefly overnight which has since resolved.     1000 Patient denies feeling any pain, ctx or cramping. Continuous efm/toco monitoring in progress. Patient inquiring if epidural can be turned off or removed so she can be more mobile since no signs of active labor. Dr. Jenkins updated, may turn off epidural and remove le. Consult with anesthesia regarding turning off epidural vs turning off and removing epidural catheter.    1005 Spoke with DEANDRE Raphael CRNA. Can turn off epidural and leave epidural cath in place for now.    1010. Epidural turned off, catheter capped.    1012 Le cath removed. Patient instructed to alert nurse for assistance with mobility and when she feels urge to urinate. Call bell in reach.    1220 Reports feeling vaginal fluid leakage. Reports she has noticed intermittent jelly like vaginal discharge with brown/light red color since she was on abt for pneumonia/flu over a week ago.  Small amount white tinged vaginal discharge present. Nitrazine negative.    1230 Assisted to bathroom with ted steady, voided 500ml urine. Assisted with adl care.    1245 Donald Rodriguez updated on report of vaginal discharge. Monitor for now.    1458 Ambulated to bathroom with standby assist, voided x1.    
10:26 PM  Patient placed back on the monitor at this time per Dr. Bennett to further evaluate her strip.  3:04 AM  Strip reviewed by Dr. Bennett at this time. Order received to take the patient off of the monitor.  
1110-Bedside and Verbal shift change report given to PARK Edge RN (oncoming nurse) by MICHELLE Mitchell RN (offgoing nurse). Report included the following information Nurse Handoff Report, Adult Overview, Intake/Output, MAR, and Med Rec Status.    1228-Discussed pt previous BP reading with CRNA orders to titrate epidural infusion from 10 to 8. CRNA raises concerns about treating BP, RN informs CRNA that pt is resting on side and has had other low readings during her stay. RN will monitor closely at this time pt is asymptomatic.   0600-PT reports some abdominal cramping sensation at this time, readjusted toco and informed MD  
1412: Call from lab that FFN positive, primary RN Sonia and Dr Brothers notified.   
1415 vertex confirmed by Dr. Brothers    1420 SVE 6/70/-2 per Dr. Brothers    1432 Per Deepa Kimbrough with Infection Prevention droplet precautions can be removed tomorrow 12/24/24 (1 week from testing positive)     1519 timeout for epidural    1525 Test dose by DEANDRE Raphael CRNA    1530 Dr. Huffman at bedside to discuss NICU    1735 SVE 6/80/-2 bulging bag per Dr. Brothers    1900 Bedside and Verbal shift change report given to SUAD Díaz RN (oncoming nurse) by PAUL Morales RN (offgoing nurse). Report included the following information Nurse Handoff Report, Index, Adult Overview, Intake/Output, MAR, and Recent Results.       
[id number]: SBAR report given to this RN per Trinidad CLIFTON. Pt denies pain, feeling contractions, LOF or bleeding and reporting positive fetal movement. Pt resting comfortably in bed with family at bedside.   
1900: Assumed care of patient. Patient denies any HA, RUQ pain, N/V, vision changes, vaginal bleeding or leakage of fluids. Patient endorses positive fetal movement. Labor education provided.   
1910: Report received from Susan CLIFTON, POC discussed, patient are assumed at this time. Patient declines contractions, cramping, leaking of fluid, vaginal bleeding or foul smelling discharge. Patient endorses positive fetal movement. Patient aware to call out for any changes or concerns.     1953: FHR reviewed with MD Jerod stated this RN can remove fetal monitors and transition to NST 3x per day. If pt has ctx, leaking of fluid or any changes in status EFM will be re-applied. MD requested NST to start again at 0600.    2000: Patient up to void and repositioned to the recliner.     0130: Patient rounding completed, patient resting at this time.     0430: Patient up to void.     0715: Bedside report given to Melissa CARRERA RN, POC discussed, patient care transferred at this time.    0745: Patient with new onset complaints of increased urinary frequency. Patient declines burning with urination or foul smell with urination. This RN discussed this with Dr. Jenkins, orders received to run a UA on pt. Melissa CLIFTON aware of new orders.   
2100- Bedside and Verbal shift change report given to MICHELLE Mitchell RN (oncoming nurse) by CHARLI Colindres RN (offgoing nurse). Report included the following information Nurse Handoff Report, Index, Adult Overview, Intake/Output, MAR, and Recent Results.     2300- Bedside and Verbal shift change report given to PARK Edge RN (oncoming nurse) by MICHELLE Mitchell RN (offgoing nurse). Report included the following information Nurse Handoff Report, Index, Adult Overview, Intake/Output, MAR, and Recent Results.     
2247 pt sitting up for epidural placement  2251 time out  2259 test dose  2303 Laying back from epidural placement    
AntePartum Progress Note    Buffy Montano  29w1d    Patient admitted for  advanced cervical dilation at  29w1d Estimated Date of Delivery: 3/14/25 states she does not  have  abdominal pain  , contractions, vaginal bleeding , and vaginal leaking of fluid .  Resting comfortably in bed.  Normal fetal movement.  Vaginal pressure reported but unchanged for the past few days.    Vitals:  Patient Vitals for the past 24 hrs:   BP Temp Temp src Pulse Resp SpO2   24 0836 108/61 97.8 °F (36.6 °C) Oral 88 16 100 %   24 0304 (!) 90/55 97.7 °F (36.5 °C) Oral 64 15 99 %   24 1947 -- -- -- 91 -- 99 %   24 1946 (!) 109/56 98.1 °F (36.7 °C) Oral 92 18 99 %   24 1626 (!) 100/59 -- -- 77 16 --   24 1133 (!) 107/57 -- -- 88 -- --   24 1000 -- -- -- 80 -- 99 %   24 0955 -- -- -- 80 -- 99 %   24 0950 -- -- -- 74 -- 99 %   24 0945 -- -- -- 79 -- 100 %     Temp (24hrs), Av.9 °F (36.6 °C), Min:97.7 °F (36.5 °C), Max:98.1 °F (36.7 °C)    I&O:   No intake/output data recorded.             No intake/output data recorded.  NST:  reactive  Uterine Activity: none    Exam:  Patient: comfortable without distress               Abdomen soft, gravid, NT               Fundus NT               Lower extremities NT without edema                                         Labs: No results found for this or any previous visit (from the past 24 hour(s)).    Assessment and Plan:   IUP @ 29w1d advanced cervical dilation, FGR 2nd %ile.  OB stable, inpatient until delivery.  S/P Magnesium for neuroprotection  S/P BMZ  course -  SCDs ordered, not on patient currently.  GBS neg  S/P NICU consult  S/P MFM consult:  FGR diagnosed.  Plan IOL 37w0d unless indicated sooner (oligo/PPROM - 34w delivery per MFM). EFW q 3 wks.  Weekly UAD.    Weekly CBC ordered      Bhavna West DO, FACOG  [facility]                
AntePartum Progress Note    Buffy Montano  29w2d    Patient admitted for  advanced cervical dilation at  29w2d Estimated Date of Delivery: 3/14/25 states she does not  have  abdominal pain, vaginal bleeding , and vaginal leaking of fluid .  Resting comfortably in bed.  Normal fetal movement.  Vaginal pressure reported but unchanged for the past few days.   Reports feeling a contraction about 5 minutes ago, first one since admission.    Vitals:  Patient Vitals for the past 24 hrs:   BP Temp Temp src Pulse Resp SpO2   24 0839 111/67 98.1 °F (36.7 °C) Oral 70 16 98 %   24 (!) 108/55 98.3 °F (36.8 °C) Oral 81 20 100 %   24 1625 (!) 106/57 -- -- 90 -- --   24 1303 (!) 113/58 97.7 °F (36.5 °C) Oral 85 16 100 %     Temp (24hrs), Av °F (36.7 °C), Min:97.7 °F (36.5 °C), Max:98.3 °F (36.8 °C)    I&O:   No intake/output data recorded.             No intake/output data recorded.  NST:  Reactive  Uterine Activity: None    Exam:  Patient: comfortable without distress                                            Labs: No results found for this or any previous visit (from the past 24 hour(s)).    Assessment and Plan:   IUP @ 29w2d advanced cervical dilation, FGR 2nd %ile.  OB stable, inpatient until delivery.  S/P Magnesium for neuroprotection  S/P BMZ  course -  SCDs ordered  GBS neg  S/P NICU consult  S/P MFM consult:  FGR diagnosed.  Plan IOL 37w0d unless indicated sooner (oligo/PPROM - 34w delivery per MFM). EFW q 3 wks.  Weekly UAD.    Weekly CBC ordered    Bhavna West DO, FACOG  [facility]    
CNM called to bedside for pt evaluation due to ctx increasing in frequency and severity.    S:   Buffy, sitting semi fowlers tearful, coping poorly with ctx.    O;    /-2, vtx, bulging bag of water.    FHT  Baseline: 140 bpm  Moderate variability  Accels; present  Decels: variable, periodic  Ctx/[place]: Irregular, moderate to palpation. Resting tone soft between.    A/P:  20 yo,  @ 29w2d admitted with PTL.   -GBS Neg  -CAT I FHT    SVE performed with pt consent. /-2  Encouraged pt to remain calm, labor support provided.  Rec made for large bore IV and fluid bolus. Pt agreeable.    FWB: cEFM/Agra  Labor mgmt: Expectant mgmt  Labor pain mgmt: Epidural when desires  Anticipate .    Dr. Rondon made aware of pt status via RN.      240   CNM returned to bedside, BSUS performed Cephalic presentation confirmed.          
Discharge instructions given to patient at bedside including but not limited to signs and symptoms and who to call; restrictions and limitations; postpartum depression. All questions answered.    AVS reviewed and charted.  
Discharge instructions given to patient at bedside including but not limited to signs and symptoms and who to call; restrictions and limitations; postpartum depression. All questions answered.    Discharge supplies given to patient.    AVS reviewed and charted.  
Labor Progress Note  Patient seen, fetal heart rate and contraction pattern evaluated at 1745. Comfortable now with epidural.     Physical Exam:  Vitals:   Vitals:    24 1607 24 1612 24 1623 24 1724   BP: 110/65  114/64 116/74   Pulse: 89  93 99   Resp:   16 16   Temp:  97.8 °F (36.6 °C)     TempSrc:  Oral     SpO2:   100% 100%   Weight:       Height:             Cervical Exam  was  examined   6 cm dilated    80% effaced    -2 station    Presenting Part: cephalic  Cervical Position: mid position  Bulging bag    Uterine Activity:: Frequency: Every 7-10 minutes  Fetal Heart Rate: Reactive  Baseline: 135 per minute  Variability: moderate  Accelerations: yes  Decelerations: none  Pitocin: N/A    Assessment/Plan:  Ms. Montano is admitted with pregnancy at 28w3d admitted with  labor      labor -expectant management    BMZ first dose given   Magnesium infusing for neuro-protection   PCN for GBS unknown pre-term   NICU is aware and has come to the bedside for consult   Recheck in 4hrs or PRN    America Brothers DO  2024  5:51 PM  
Labor Progress Note  Patient seen, fetal heart rate and contraction pattern evaluated, patient examined. Not feeling any contractions this morning or overnight.  Felt one contraction at 5am but other than that is very comfortable. She has an epidural and le catheter in place. Denies SOB, has some cough. Has nausea which she has experienced since having flu.  Denies LOF. Had some vaginal spotting.     Patient Vitals for the past 2 hrs:   BP Pulse Resp SpO2   24 0823 (!) 98/55 87 16 98 %   24 0723 (!) 100/57 81 -- 96 %       Physical Exam:  Cervical Exam:  deferred  Uterine Activity: None  Fetal Heart Rate: baseline 130bpm, moderate variability, occasional infrequent accelerations, no decelerations    Assessment/Plan:  17yo  at 28w4d presenting with  labor, seemingly arrested at this point.  - due for second dose of BMZ at 1pm. On IV magnesium for now, will consider discontinuing after receiving second dose of BMZ if remaining stable and asymptomatic.   - MFM consultation  - reassuring fetal status    Alanna Nunes MD  
NST reactive pt states she is feeling some ctx but not bad, off monitor to eat   
PostPartum Note    Buffy Montano  [id number]  2005  19 y.o.    S:  Ms. Buffy Montano is a 19 y.o.  PPD #0 s/p  @ 29w3d for PTL.  Doing well.  She had a baby boy.  Her lochia is like a period.  She describes her pain as mild and is well controlled with PO medications. She is ambulating and voiding.  Tolerating PO intake.      O:   /65   Pulse 80   Temp 97.3 °F (36.3 °C) (Oral)   Resp 14   Ht 1.575 m (5' 2\")   Wt 70.3 kg (155 lb)   SpO2 99%   Breastfeeding Unknown   BMI 28.35 kg/m²     Gen - No acute distress  Respirations - nonlabored   Abdomen - Fundus firm below the umbilicus   Ext - Warm, well perfused, nontender     Lab Results   Component Value Date/Time    WBC 17.8 2024 10:13 PM    HGB 10.8 2024 10:13 PM    HCT 31.5 2024 10:13 PM     2024 10:13 PM    MCV 91.3 2024 10:13 PM         A/P:  19 y.o.  PPD #0 s/p  @ 29w3d for PTL doing well.    1.  Routine PP instructions/ care discussed  2.  Blood type - Rh pos  3.  Rubella immune  4.  Circumcision: deferred, baby in NICU   5.  Discharge PPD 1 or 2   6.  F/U 1 week for mood check.        Denisse Lucas MD  [facility]      
PostPartum Note    Buffy Montano  [id number]  2005  19 y.o.    S:  Ms. Buffy Montano is a 19 y.o.  PPD #2 s/p  @ w3d.  Doing well.  She had a baby boy.  Her lochia is like a period.  She describes her pain as mild and is well controlled with PO medications.  She is pumping feeding and this is going well.  She is ambulating and voiding.  Tolerating PO intake.      O:   /74   Pulse 84   Temp 98.5 °F (36.9 °C) (Axillary)   Resp 16   Ht 1.575 m (5' 2\")   Wt 70.3 kg (155 lb)   SpO2 100%   Breastfeeding Unknown   BMI 28.35 kg/m²     Gen - No acute distress  Respirations - nonlabored   Abdomen - Fundus firm below the umbilicus   Ext - Warm, well perfused, nontender     Lab Results   Component Value Date/Time    WBC 17.8 2024 10:13 PM    HGB 10.8 2024 10:13 PM    HCT 31.5 2024 10:13 PM     2024 10:13 PM    MCV 91.3 2024 10:13 PM         A/P:  PPD #2 s/p  @ 29w3d doing well.    -  Routine PP instructions/ care discussed  -  Blood type - Rh pos  -  Circumcision: NICU   -  Anemia appropriate  - Discharge today   -  F/U 4-6 weeks for PP check.      Arcadio Domínguez MD  [facility]      
PostPartum Note    Buffy Montano  [id number]  2005  19 y.o.    S:  Ms. Buffy Montano is a 19 y.o.  PPD #1 s/p  @ 29w3d for PTL.  Doing well.  She had a baby boy.  Her lochia is like a period.  She describes her pain as mild and is well controlled with PO medications. She is ambulating and voiding.  Tolerating PO intake.      O:   /76   Pulse 90   Temp 98.6 °F (37 °C) (Oral)   Resp 18   Ht 1.575 m (5' 2\")   Wt 70.3 kg (155 lb)   SpO2 100%   Breastfeeding Unknown   BMI 28.35 kg/m²     Gen - No acute distress  Respirations - nonlabored   Abdomen - Fundus firm below the umbilicus   Ext - Warm, well perfused, nontender     Lab Results   Component Value Date/Time    WBC 17.8 2024 10:13 PM    HGB 10.8 2024 10:13 PM    HCT 31.5 2024 10:13 PM     2024 10:13 PM    MCV 91.3 2024 10:13 PM         A/P:  19 y.o.  PPD #1 s/p  @ 29w3d for PTL doing well.    1.  Routine PP instructions/ care discussed  2.  Blood type - Rh pos  3.  Rubella immune  4.  Circumcision: deferred, baby in NICU   5.  Discharge PPD2   6.  F/U 1 week for mood check.      Yoli Medina MD  [facility]         
Progress Note    Buffy Montano  [id number]  2005   28w5d      S:  Feeling more movement with epidural turned off.  Would like to shower and eat.      Magnesium turned off last night.  Epidural off.  S/p BMTZ x2.  Has no vb/lof.  Only mild cramping noted.  Good FM.     O:    /80   Pulse 75   Temp 98.3 °F (36.8 °C) (Oral)   Resp 16   Ht 1.575 m (5' 2\")   Wt 70.3 kg (155 lb)   SpO2 97%   BMI 28.35 kg/m²        EFM Documentation (Table Form)  Patient Vitals for the past 4 hrs:   Mode Baseline Rate Baseline Classification Variability Pattern Patient Feels Fetal Movement Interventions   24 1130 External  bpm Normal 6-25 BPM -- Yes --   24 1100 External  bpm Normal 6-25 BPM Accelerations Yes --   24 1030 External  bpm Normal 6-25 BPM Accelerations Yes --   24 1000 External  bpm Normal 6-25 BPM Other (Comment) Yes --   24 0930 External  bpm Normal 6-25 BPM Other (Comment) Yes --   24 0855 External  bpm Normal 6-25 BPM Other (Comment) Yes Ultrasound Adjusted;[place] Adjusted   24 0830 External  bpm Normal 6-25 BPM -- Yes --   24 0800 External  bpm Normal 6-25 BPM Other (Comment) -- --        Lab Results   Component Value Date    WBC 12.7 (H) 2024    HGB 9.8 (L) 2024    HCT 29.3 (L) 2024    MCV 90.7 2024     2024     Cervix deferred     A/P:  18 y.o.  @ 28w5d - PTL     Labor seems arrested as of now.  Epidural off.  Will allow to get OOB to shower in a chair once she is ambulatory and void.    S/p BMTZ.   Magnesium for neuro Ppx if ctx resume.   S/p  consult.   MFM tomorrow if still pregnant.       Shilpa Jenkins MD  [facility]      
Pt is feeling more ctx     2135 EFMX2 applied SVE done by [facility] cervix 7 cm dilated pt moved to rm 12, charge nurse and NICU aware     2250 report given to ELODIA Mcnair   
Pt seen rested in bed, no complaints, assessment done, vitals taken, EFMX2 applied     2040 tracing reviewed by Dr Guthrie, NST reactive pt off monitor      
palpable    SVE: 7cm/100%/-2, membranes bulging           Data Review   Recent Results (from the past 24 hour(s))   CBC with Auto Differential    Collection Time: 24 10:13 PM   Result Value Ref Range    WBC 17.8 (H) 3.6 - 11.0 K/uL    RBC 3.45 (L) 3.80 - 5.20 M/uL    Hemoglobin 10.8 (L) 11.5 - 16.0 g/dL    Hematocrit 31.5 (L) 35.0 - 47.0 %    MCV 91.3 80.0 - 99.0 FL    MCH 31.3 26.0 - 34.0 PG    MCHC 34.3 30.0 - 36.5 g/dL    RDW 13.1 11.5 - 14.5 %    Platelets 409 (H) 150 - 400 K/uL    MPV 9.2 8.9 - 12.9 FL    Nucleated RBCs 0.0 0  WBC    nRBC 0.00 0.00 - 0.01 K/uL    Neutrophils % 78 (H) 32 - 75 %    Lymphocytes % 13 12 - 49 %    Monocytes % 6 5 - 13 %    Eosinophils % 1 0 - 7 %    Basophils % 0 0 - 1 %    Immature Granulocytes % 2 (H) 0.0 - 0.5 %    Neutrophils Absolute 13.8 (H) 1.8 - 8.0 K/UL    Lymphocytes Absolute 2.4 0.8 - 3.5 K/UL    Monocytes Absolute 1.1 (H) 0.0 - 1.0 K/UL    Eosinophils Absolute 0.2 0.0 - 0.4 K/UL    Basophils Absolute 0.1 0.0 - 0.1 K/UL    Immature Granulocytes Absolute 0.3 (H) 0.00 - 0.04 K/UL    Differential Type AUTOMATED             Assessment:     Principal Problem:     labor in second trimester with  delivery in third trimester, single or unspecified fetus  Resolved Problems:    * No resolved hospital problems. *    18 yo  @ 29w2d with  labor  Plan:   -Magnesium sulfate 6gm bolus being administered.   -IV has been reinserted and labs for CBC, T&S sent. IVF bolus infusing.  -Pt desires epidural-anesthesia notified.   -Cat 1 FHT.  -NICU aware.   -Discussed anticipated vaginal delivery. Discussed risks of it to include but not be limited to risks of fetal heart rate abnormalities requiring emergency  section and its associated risks including risk of damage to uterus and surrounding organs including bowel, bladder, nerves, blood vessels, risk of bleeding, need for blood transfusion and its associated risks, infection, reoperation, prolonged

## 2025-07-23 ENCOUNTER — ROUTINE PRENATAL (OUTPATIENT)
Age: 20
End: 2025-07-23
Payer: MEDICAID

## 2025-07-23 VITALS — SYSTOLIC BLOOD PRESSURE: 117 MMHG | HEART RATE: 87 BPM | DIASTOLIC BLOOD PRESSURE: 74 MMHG

## 2025-07-23 DIAGNOSIS — Z34.90 PREGNANCY, UNSPECIFIED GESTATIONAL AGE: ICD-10-CM

## 2025-07-23 DIAGNOSIS — Z34.90 PREGNANCY, UNSPECIFIED GESTATIONAL AGE: Primary | ICD-10-CM

## 2025-07-23 PROCEDURE — 99214 OFFICE O/P EST MOD 30 MIN: CPT | Performed by: STUDENT IN AN ORGANIZED HEALTH CARE EDUCATION/TRAINING PROGRAM

## 2025-07-23 PROCEDURE — 76817 TRANSVAGINAL US OBSTETRIC: CPT | Performed by: STUDENT IN AN ORGANIZED HEALTH CARE EDUCATION/TRAINING PROGRAM

## 2025-07-23 PROCEDURE — 76805 OB US >/= 14 WKS SNGL FETUS: CPT | Performed by: STUDENT IN AN ORGANIZED HEALTH CARE EDUCATION/TRAINING PROGRAM

## 2025-07-23 NOTE — PROCEDURES
PATIENT: SAGE POLK   -  : 2005   -  DOS:2025   -  INTERPRETING PROVIDER:Cassi Ramos,   Indication  ========    History of pre-term delivery, short interval pregnancy    Method  ======    Transabdominal and transvaginal ultrasound examination. View: suboptimal due to early gestational age    Dating  ======    LMP on: 3/30/2025  GA by LMP 16 w + 3 d  LY by LMP: 2026  Previous Ultrasound on: 2025  Type of prior assessment: GA  GA at prior assessment date 15 w + 1 d  GA by previous U/S 16 w + 3 d  LY by previous Ultrasound: 2026  Ultrasound examination on: 2025  GA by U/S based upon: AC, BPD, Femur, HC  GA by U/S 16 w + 1 d  LY by U/S: 2026  Assigned: based on the LMP, selected on 2025  Assigned GA 16 w + 3 d  Assigned LY: 2026    Fetal Growth Overview  =================    Exam date        GA              BPD (mm)          HC (mm)              AC (mm)              FL (mm)             HL (mm)             EFW (g)  2025        16w 3d        33.3     43%        119.9    15%        100.3     38%        20.3    30%        19.4     27%        143    21%    Fetal Biometry  ============    Standard  BPD 33.3 mm 16w 2d 43% Hadlock  OFD 41.7 mm 16w 3d 50% Shun  .9 mm 16w 0d 15% Hadlock  Cerebellum tr 15.6 mm  31% [place]  .3 mm 16w 0d 38% Hadlock  Femur 20.3 mm 16w 0d 30% Hadlock  Humerus 19.4 mm 15w 5d 27% Shun   g 15w 6d 21% Hadlock  EFW (lb) 0 lb  EFW (oz) 5 oz  EFW by: Hadlock (BPD-HC-AC-FL)  Extended   5.1 mm  Nasal bone 3.9 mm  Rt Renal pelvis ap 2.2 mm  Lt Renal pelvis ap 1.3 mm  Head / Face / Neck  Nasal bone: present  Other Structures   bpm    General Evaluation  ==============    Cardiac activity present.  bpm. Fetal movements: visualized. Presentation: Cephalic  Placenta: Placental site: posterior, appropriate distance from the internal os. Placental edge-to-cervical os distance 4.3 cm  Umbilical cord: Cord vessels:

## 2025-07-23 NOTE — PROGRESS NOTES
Please see ultrasound report under Imaging tab or Media tab.  Cassi Ramos MD  [facility]

## 2025-08-20 ENCOUNTER — ROUTINE PRENATAL (OUTPATIENT)
Age: 20
End: 2025-08-20
Payer: MEDICAID

## 2025-08-20 VITALS — DIASTOLIC BLOOD PRESSURE: 68 MMHG | SYSTOLIC BLOOD PRESSURE: 112 MMHG | HEART RATE: 93 BPM

## 2025-08-20 DIAGNOSIS — Z34.90 PREGNANCY, UNSPECIFIED GESTATIONAL AGE: ICD-10-CM

## 2025-08-20 PROCEDURE — 76811 OB US DETAILED SNGL FETUS: CPT | Performed by: STUDENT IN AN ORGANIZED HEALTH CARE EDUCATION/TRAINING PROGRAM

## 2025-08-20 PROCEDURE — 76817 TRANSVAGINAL US OBSTETRIC: CPT | Performed by: STUDENT IN AN ORGANIZED HEALTH CARE EDUCATION/TRAINING PROGRAM

## 2025-08-20 PROCEDURE — 99214 OFFICE O/P EST MOD 30 MIN: CPT | Performed by: STUDENT IN AN ORGANIZED HEALTH CARE EDUCATION/TRAINING PROGRAM
